# Patient Record
Sex: FEMALE | Race: WHITE | HISPANIC OR LATINO | Employment: UNEMPLOYED | URBAN - METROPOLITAN AREA
[De-identification: names, ages, dates, MRNs, and addresses within clinical notes are randomized per-mention and may not be internally consistent; named-entity substitution may affect disease eponyms.]

---

## 2017-08-15 ENCOUNTER — OFFICE VISIT (OUTPATIENT)
Dept: PAIN MEDICINE | Facility: CLINIC | Age: 42
End: 2017-08-15
Payer: COMMERCIAL

## 2017-08-15 VITALS — HEART RATE: 88 BPM | WEIGHT: 154 LBS | SYSTOLIC BLOOD PRESSURE: 128 MMHG | DIASTOLIC BLOOD PRESSURE: 82 MMHG

## 2017-08-15 DIAGNOSIS — M79.7 FIBROMYALGIA: Primary | ICD-10-CM

## 2017-08-15 DIAGNOSIS — M54.2 CERVICALGIA: ICD-10-CM

## 2017-08-15 PROCEDURE — 99999 PR PBB SHADOW E&M-NEW PATIENT-LVL III: CPT | Mod: PBBFAC,,, | Performed by: ANESTHESIOLOGY

## 2017-08-15 PROCEDURE — 99204 OFFICE O/P NEW MOD 45 MIN: CPT | Mod: S$GLB,,, | Performed by: ANESTHESIOLOGY

## 2017-08-15 RX ORDER — DULOXETIN HYDROCHLORIDE 60 MG/1
60 CAPSULE, DELAYED RELEASE ORAL DAILY
COMMUNITY

## 2017-08-15 RX ORDER — NAPROXEN AND ESOMEPRAZOLE MAGNESIUM 20; 500 MG/1; MG/1
1 TABLET, DELAYED RELEASE ORAL 2 TIMES DAILY PRN
Qty: 30 TABLET | Refills: 4 | Status: SHIPPED | OUTPATIENT
Start: 2017-08-15 | End: 2017-09-14

## 2017-08-15 RX ORDER — NAPROXEN 500 MG/1
500 TABLET ORAL 2 TIMES DAILY
COMMUNITY
End: 2017-08-15

## 2017-08-16 ENCOUNTER — TELEPHONE (OUTPATIENT)
Dept: PAIN MEDICINE | Facility: CLINIC | Age: 42
End: 2017-08-16

## 2017-08-16 NOTE — TELEPHONE ENCOUNTER
----- Message from Lisa Nayak sent at 8/16/2017 10:54 AM CDT -----  [facility] called.   [phone]. [phone]    Can pharmacy dispense 60 of Vimovo.   Please call.

## 2017-09-06 ENCOUNTER — CLINICAL SUPPORT (OUTPATIENT)
Dept: REHABILITATION | Facility: HOSPITAL | Age: 42
End: 2017-09-06
Attending: ANESTHESIOLOGY
Payer: COMMERCIAL

## 2017-09-06 DIAGNOSIS — Z74.09 IMPAIRED MOBILITY: ICD-10-CM

## 2017-09-06 DIAGNOSIS — M79.7 FIBROMYALGIA: ICD-10-CM

## 2017-09-06 DIAGNOSIS — M54.2 CHRONIC NECK PAIN: Primary | ICD-10-CM

## 2017-09-06 DIAGNOSIS — R29.3 POOR POSTURE: ICD-10-CM

## 2017-09-06 DIAGNOSIS — G89.29 CHRONIC NECK PAIN: Primary | ICD-10-CM

## 2017-09-06 PROCEDURE — 97110 THERAPEUTIC EXERCISES: CPT | Mod: PN

## 2017-09-06 PROCEDURE — 97162 PT EVAL MOD COMPLEX 30 MIN: CPT | Mod: PN

## 2017-09-06 NOTE — PLAN OF CARE
TIME RECORD    Date: 09/06/2017    Start Time:  1118  Stop Time:  1200    PROCEDURES:    TIMED  Procedure Min.   TE 10'                     UNTIMED  Procedure Min.   IE 32'         Total Timed Minutes:  10'  Total Timed Units:  1  Total Untimed Units:  1  Charges Billed/# of units:  2 (MCE-1, TE-1)    OUTPATIENT PHYSICAL THERAPY   PATIENT EVALUATION  Onset Date: 8 months ago  Primary Diagnosis:   1. Chronic neck pain     2. Impaired mobility     3. Poor posture     4. Fibromyalgia       Treatment Diagnosis: Chronic neck pain, poor posture, decreased cervical and UE strength and ROM   No past medical history on file.  Precautions: Standard, Allergies-Penecillin, Orly TAVARES instructed pt not to lift arms above shoulders.  Prior Therapy: None for current condition  Medications: Ne Lang has a current medication list which includes the following prescription(s): duloxetine and naproxen-esomeprazole.  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  Ne Lang reports neck pain beginning 8 months ago. No injury/event preceding it, pt reports cervical osteoporosis as well as fibromyalgia causing pain. Tension and stress increase pain, as well as household activities including cleaning, cooking, and lifting. Pt reports pain has improved since 8 months ago. Rest and relaxation decrease pain. Pt reports she used to have constant numbness in B UE, now has it 1x/week. Pt reports difficulty lifting >=5 lbs and that arms feel weak and heavy.     Pt was dx  with fibromyalgia 6 years ago in [place]. Pain started in wrist, treated as carpal tunnel. Every 2 years pt goes to [place] to have treatment done for Fibromyalgia, including acupuncture, natural healing methods, psychology and psychiatry sessions as well as medications prescribed by these clinicians. Reviewed with Dr. Woodson, who approved treatments and added a medication and topical cream for pain. Pt expressed she has been going to [place] for tx because she has had difficulty receiving care in  the [place] for fibromyalgia due to language barrier.     Pain:  Location: midline of cervical spine  Description: Aching, Dull, Burning and Electric  Pain Scale: 2/10 at best 5/10 now  9/10 at worst    Objective     Posture: Sitting: slumped, rounded shoulders, forward head  Palpation: TTP to B cervical paraspinals, levator scap, UT  Sensation: Intact B UE  Range of Motion/Strength:     Cervical AROM: Pain/Dysfunction with Movement:   Flexion 45 Pain in neck   Extension 25 Increased pain in neck   Right side bending 35 Pain in neck   Left side bending 30 Pain in neck   Right rotation 50 Pain in neck, decreased   Left rotation 50 Pain in neck, decreased     UE MMTs  Horizontal abduction: 4-/5 B, pain in shoulders B  Scaption: 4-/5 B, pain in shoulders B  Rhomboids: 4/5 B, pain in shoulders B    Shoulder  Right   Left  Pain/Dysfunction with Movement    AROM PROM MMT AROM PROM MMT    flexion WNL NT 4-/5 WNL NT 4-/5 Pain in shoulders B with MMT; pt requests termination of MMT before full resistance given   abduction 160  170 4-/5 160 180 4-/5 Pain in shoulders B with MMT; pt requests termination of MMT before full resistance given   Internal rotation NT NT 4-/5 NT NT 4-/5 Functional IR L3 B  Pain in shoulders B with MMT; pt requests termination of MMT before full resistance given   ER at 0° abd NT NT 4-/5 NT NT 4-/5 Functional ER C7 B  Pain in shoulders B with MMT; pt requests termination of MMT before full resistance given      Elbow  Right   Left  Pain/Dysfunction with Movement    AROM PROM MMT AROM PROM MMT    flexion NT NT 4+/5 NT NT 4+/5 Pain in elbow B with MMT   extension NT NT 4+/5 NT NT 4+/5 Pain in elbow B with MMT      (#)    Right 12 kg   Left 15 kg     Gait: no AD  Analysis: Independent  Bed Mobility:Independent  Transfers: Independent  Special Tests: Spurlings (-) B  Alar Ligament (-) B  Sharp Vika (-)  Distraction (+) for pain relief     Other: Chin tuck + cervical flexion (logissimus colli strength): Pt able to hold for 3 sec (poor - activation)    Functional Limitation Reports: G codes  Tool: FOTO Neck SURVEY  Category: Changing and maintaining body position ()  Limitation: 48%  Current: CK = at least 40% but < 60% impaired, limited or restricted  Goal: CJ = at least 20% but < 40% impaired, limited or restricted    Treatment: Therapeutic exercise including UE/cervical strengthening/stretching, postural education, and modalities prn. Instructed pt on HEP consisting of UT stretch and scapular retraction/depressions. Pt demo/verbalized by performing exercises x 10'. POC discussed with pt and pt verbalized agreement.    Hx of FIBRO, NO MT    Assessment       Initial Assessment (Pertinent finding, problem list and factors affecting outcome): Pt is a 43 yo female presenting to PT with pain, decreased cervical and UE ROM and strength, poor posture, and functional deficits with household activities. Primary impairments may be related to fibromyalgia as well as possible mechanical stresses from poor posture. Pt emotional several times during evaluation due to pain and frustration of 6 year hx of pain. Pt would benefit from skilled PT to address limitations and increase functional mobility.    History  Co-morbidities and personal factors that may impact the plan of care Examination  Body Structures and Functions, activity limitations and participation restrictions that may impact the plan of care Clinical  Presentation   Decision Making/ Complexity Score   Co-morbidities:   Fibromyalgia    Personal Factors:  Depression  Sleep dysfunction     moderate Body Regions: head, neck, UE, trunk, back, LE    Body Systems: musculoskeletal - posture, ROM, strength; neuromuscular - sensation, balance, gait    Activity limitations: household chores, activities considered stressful    Participation Restrictions: caseload of caregiving    high     FOTO Neck Survey: 48% limitation    moderate   moderate       Rehab Potiential: fair  Barriers to Rehab: dx of fibromyalgia, language barrier  Short Term Goals (4 Weeks): 10/6/17  1. Pt will be independent with HEP to supplement PT in improving functional mobility.  2. Pt to improve all impaired UE MMTs to >/=4/5  grade to improve strength for lifting tasks.  3. Pt will improve cervical ROM by 10 deg in all planes to restore functional mobility.  Long Term Goals (8 Weeks): 11/6/17  1.  Pt will improve FOTO score to </= 39% limited to decrease perceived limitation with mobility.  2. Pt to improve all impaired UE MMTs to >/= 4+/5 to improve strength for lifting tasks.  3. Pt will restore cervical ROM to WFL in all planes to restore functional mobility.  4. Pt will restore UE ROM to WFL in all planes to restore functional mobility.  5. Pt will lift >/= 5 lbs with pain </=3/10 to promote functional QOL.  6. Pt will have pain </3/10 with UE MMTs to promote functional QOL    Plan     Certification Period: 9/6/17 to 11/6/17  Recommended Treatment Plan: 2 times per week for 8 weeks: Moist Heat/ Ice, Neuromuscular Re-ed, Patient Education, Therapeutic Activites and Therapeutic Exercise              *Hx of FIBRO, NO MT*  Other Recommendations: modalities prn, ASTYM prn, kinesiotape prn, Functional Dry Needling prn     Therapist: Rosa Mata, PT    I CERTIFY THE NEED FOR THESE SERVICES FURNISHED UNDER THIS PLAN OF TREATMENT AND WHILE UNDER MY CARE    Physician's comments:  ________________________________________________________________________________________________________________________________________________      Physician's Name: ___________________________________

## 2017-09-12 ENCOUNTER — CLINICAL SUPPORT (OUTPATIENT)
Dept: REHABILITATION | Facility: HOSPITAL | Age: 42
End: 2017-09-12
Attending: ANESTHESIOLOGY
Payer: COMMERCIAL

## 2017-09-12 DIAGNOSIS — M54.2 CHRONIC NECK PAIN: ICD-10-CM

## 2017-09-12 DIAGNOSIS — R29.3 POOR POSTURE: ICD-10-CM

## 2017-09-12 DIAGNOSIS — Z74.09 IMPAIRED MOBILITY: ICD-10-CM

## 2017-09-12 DIAGNOSIS — G89.29 CHRONIC NECK PAIN: ICD-10-CM

## 2017-09-12 PROCEDURE — 97110 THERAPEUTIC EXERCISES: CPT | Mod: PN

## 2017-09-12 PROCEDURE — 97140 MANUAL THERAPY 1/> REGIONS: CPT | Mod: PN

## 2017-09-12 NOTE — PROGRESS NOTES
TIME RECORD    Date:  09/12/2017    Start Time:  9:00  Stop Time:  10:00    PROCEDURES:    TIMED  Procedure Min.   TE 30'   MT 15'                 UNTIMED  Procedure Min.   MHP 10'   Supervised 5'     Total Timed Minutes:  50  Total Timed Units:  3  Total Untimed Units:  NC  Charges Billed/# of units:  3 (2 TE + 1 MT)      Progress/Current Status    Subjective:     Patient ID: Ne Lang is a 42 y.o. female.  Diagnosis:   1. Chronic neck pain     2. Impaired mobility     3. Poor posture       Pain: 5 /10  Pt  present for start of session. Pt reports neck/shoulder/arm pain that is aggravated by working ( taker)      Objective:     Treatment: Pt started with a cervical MHP x10' with pt seated; supervised UBEx5', MT x15' consisting of STM/MFR to cervical paraspinals; B UT, B medial scapula musculature, and cervical distraction; and pt performed 1:1 TE x 30'.  DATE 9/12/17   VISIT 2   FOTO 2/5   Cap Visit  Cap Total 125.80  233.38   MT 15'   MHP 10'   UBE 6'   Cervical retractions 12x5''   Stretches: UT and Levator Scap 3x30'' each   Standing:    Rows 2x15 GTB   Lat pull downs 2x15 RTC seated   Shoulder box rolls 15x seated   INITIALS [place]         Assessment:   Pt demo fair tolerance of all TE with increased pain in other joints (elbows/hands) during back exercises; educated on resting if need and avoid significant pain during activities. Pt reported decreased pain less than or equal to 3/10 in her neck and arms. Pt requires increased TE for neck stability and long term pain reduction. Pt  present for entire session.    Patient Education/Response:   Pt educated on sleeping posture to decrease B UE swelling and body mechanics during ADLs and work.    Plans and Goals:   Cont per POC, and increase reps/intensity per pt tolerance.    Short Term Goals (4 Weeks): 10/6/17  1. Pt will be independent with HEP to supplement PT in improving functional mobility.  2. Pt to improve all impaired UE  MMTs to >/=4/5  grade to improve strength for lifting tasks.  3. Pt will improve cervical ROM by 10 deg in all planes to restore functional mobility.  Long Term Goals (8 Weeks): 11/6/17  1.  Pt will improve FOTO score to </= 39% limited to decrease perceived limitation with mobility.  2. Pt to improve all impaired UE MMTs to >/= 4+/5 to improve strength for lifting tasks.  3. Pt will restore cervical ROM to WFL in all planes to restore functional mobility.  4. Pt will restore UE ROM to WFL in all planes to restore functional mobility.  5. Pt will lift >/= 5 lbs with pain </=3/10 to promote functional QOL.  6. Pt will have pain </3/10 with UE MMTs to promote functional QOL

## 2017-09-13 ENCOUNTER — DOCUMENTATION ONLY (OUTPATIENT)
Dept: REHABILITATION | Facility: HOSPITAL | Age: 42
End: 2017-09-13

## 2017-09-15 ENCOUNTER — CLINICAL SUPPORT (OUTPATIENT)
Dept: REHABILITATION | Facility: HOSPITAL | Age: 42
End: 2017-09-15
Attending: ANESTHESIOLOGY
Payer: COMMERCIAL

## 2017-09-15 DIAGNOSIS — G89.29 CHRONIC NECK PAIN: ICD-10-CM

## 2017-09-15 DIAGNOSIS — Z74.09 IMPAIRED MOBILITY: ICD-10-CM

## 2017-09-15 DIAGNOSIS — M54.2 CHRONIC NECK PAIN: ICD-10-CM

## 2017-09-15 DIAGNOSIS — R29.3 POOR POSTURE: ICD-10-CM

## 2017-09-15 PROCEDURE — 97140 MANUAL THERAPY 1/> REGIONS: CPT | Mod: PN

## 2017-09-15 PROCEDURE — 97110 THERAPEUTIC EXERCISES: CPT | Mod: PN

## 2017-09-15 NOTE — PROGRESS NOTES
"TIME RECORD    Date:  09/15/2017    Start Time:  0755  Stop Time:  0902    PROCEDURES:    TIMED  Procedure Min.   TE 47'   MT 10'             UNTIMED  Procedure Min.   MHP 10'         Total Timed Minutes:  57'  Total Timed Units:  4  Total Untimed Units:  1  Charges Billed/# of units:  4 (TE-3, MT-1)      Progress/Current Status    Subjective:     Patient ID: Ne Lang is a 42 y.o. female.  Diagnosis:   1. Chronic neck pain     2. Impaired mobility     3. Poor posture       Pain: 7 /10 neck    Pt reports feeling better overall last session. MT helped with pain in neck but not B UE. Rows and lats made her slightly sore.     Objective:     TE 1:1 with PT x 47' per log. Added cervical rotation, horizontal abduction, pec stretch, ITYs, and protraction to TE to improve posture and cervical ROM. MT x 10' consisting of STM/MFR to cervical paraspinals; B UT, B medial scapula musculature, and cervical distraction. Session ended with MHP x 10' to neck.    DATE 9/14/17 9/12/17   VISIT 3 2   FOTO 3/5 2/5   Cap Visit  Cap Total 125.80  359.18 125.80  233.38   MT 10' 15'   MHP 10' 10'   UBE  6'   Cervical retractions 15x5'' 12x5''   Cervical rotations 5"x10 B    Horizontal abduction 2x10 YTB    Protraction 2x10 1# dowel    Stretches: UT and Levator Scap  Pec (corner) 3x30'' each 3x30'' each   Standing:     Rows 2x15 GTC seated with 1/2 roll behind back 2x15 GTB   Lat pull downs 2x10 GTC seated with 1/2 roll behind back 2x15 RTC seated   ITY With trunk supported on SB 2x10    Shoulder box rolls 15x seated 15x seated   INITIALS CC [place]     Assessment:     Increased neck pain with ITYs in prone, adjusted to standing with trunk supported on SB. Pt instructed to perform ITYs through pain free shoulder range. Slight pain in neck towards end of horizontal abduction, but performed all new TE well and had no neck/ UE pain with other exercises.  Pt re-educated on resting if need and avoid significant pain during activities. Pt " reported fatigue but decreased pain at end of session, no specific rating given.    Patient Education/Response:     Cont HEP. Pt inquired of use of TB with exercises, PT responded that we will give pt TB with increased tolerance to resisted exercises during PT sessions.    Plans and Goals:     Cont per POC, and increase reps/intensity per pt tolerance.    Short Term Goals (4 Weeks): 10/6/17  1. Pt will be independent with HEP to supplement PT in improving functional mobility.  2. Pt to improve all impaired UE MMTs to >/=4/5  grade to improve strength for lifting tasks.  3. Pt will improve cervical ROM by 10 deg in all planes to restore functional mobility.  Long Term Goals (8 Weeks): 11/6/17  1.  Pt will improve FOTO score to </= 39% limited to decrease perceived limitation with mobility.  2. Pt to improve all impaired UE MMTs to >/= 4+/5 to improve strength for lifting tasks.  3. Pt will restore cervical ROM to WFL in all planes to restore functional mobility.  4. Pt will restore UE ROM to WFL in all planes to restore functional mobility.  5. Pt will lift >/= 5 lbs with pain </=3/10 to promote functional QOL.  6. Pt will have pain </3/10 with UE MMTs to promote functional QOL

## 2017-09-19 ENCOUNTER — CLINICAL SUPPORT (OUTPATIENT)
Dept: REHABILITATION | Facility: HOSPITAL | Age: 42
End: 2017-09-19
Attending: ANESTHESIOLOGY
Payer: COMMERCIAL

## 2017-09-19 DIAGNOSIS — G89.29 CHRONIC NECK PAIN: ICD-10-CM

## 2017-09-19 DIAGNOSIS — Z74.09 IMPAIRED MOBILITY: ICD-10-CM

## 2017-09-19 DIAGNOSIS — R29.3 POOR POSTURE: ICD-10-CM

## 2017-09-19 DIAGNOSIS — M54.2 CHRONIC NECK PAIN: ICD-10-CM

## 2017-09-19 PROCEDURE — 97110 THERAPEUTIC EXERCISES: CPT | Mod: PN

## 2017-09-19 PROCEDURE — 97140 MANUAL THERAPY 1/> REGIONS: CPT | Mod: PN

## 2017-09-19 NOTE — PROGRESS NOTES
"TIME RECORD    Date:  09/19/2017    Start Time:  0805  Stop Time:  0900    PROCEDURES:    TIMED  Procedure Min.   TE 45'   MT 10'             UNTIMED  Procedure Min.   MHP 10'         Total Timed Minutes: 55'  Total Timed Units:  4  Total Untimed Units:  0  Charges Billed/# of units:  4 (TE-3, MT-1)      Progress/Current Status    Subjective:     Patient ID: Ne Lang is a 42 y.o. female.  Diagnosis:   1. Chronic neck pain     2. Impaired mobility     3. Poor posture       Pain: 7/10 neck and R arm pain  Pt reports her neck is feeling good and her main pain right now is in her L arm that is mostly due to her fibromyalgia.    Objective:     TE 1:1 with PT x 45' per log. MT x 10' consisting of STM/MFR to cervical paraspinals; B UT, B medial scapula musculature, and cervical distraction with cervical rotation. Session ended with MHP x 10' to neck.    DATE 9/18/17 9/14/17 9/12/17   VISIT 4 3 2   FOTO 4/5 3/5 2/5   Cap Visit  Cap Total 125.80  484.98 125.80  359.18 125.80  233.38   MT 10' 10' 15'   MHP 10' 10' 10'   UBE --  6'   Cervical retractions 15x5'' 15x5'' 12x5''   Cervical rotations 5''x10 5"x10 B    Horizontal abduction 2x10 YTB 2x10 YTB    Protraction 2x10 2# dowel 2x10 1# dowel    Stretches: UT and Levator Scap  Pec (corner) 3x30'' each 3x30'' each 3x30'' each   Standing:      Rows 3x10 GTC seated with 1/2 roll behind back 2x15 GTC seated with 1/2 roll behind back 2x15 GTB   Lat pull downs 3x10 GTC seated with 1/2 roll behind back 2x10 GTC seated with 1/2 roll behind back 2x15 RTC seated   ITY With trunk supported on SB 2x10 With trunk supported on SB 2x10    Shoulder box rolls 15x 15x seated 15x seated   INITIALS [place] CC [place]     Assessment:   Pt reported some forearm numbness B during pec stretch that went away upon completion, and intermittent L arm pain during rows and SB exercises. Pt performed all TE to completion with min neck/arm pain. Pt reported 2/10 neck pain and no arm pain at end of " session.      Patient Education/Response:     Cont HEP.    Plans and Goals:     Cont per POC, and increase reps/intensity per pt tolerance.    Short Term Goals (4 Weeks): 10/6/17  1. Pt will be independent with HEP to supplement PT in improving functional mobility.  2. Pt to improve all impaired UE MMTs to >/=4/5  grade to improve strength for lifting tasks.  3. Pt will improve cervical ROM by 10 deg in all planes to restore functional mobility.  Long Term Goals (8 Weeks): 11/6/17  1.  Pt will improve FOTO score to </= 39% limited to decrease perceived limitation with mobility.  2. Pt to improve all impaired UE MMTs to >/= 4+/5 to improve strength for lifting tasks.  3. Pt will restore cervical ROM to WFL in all planes to restore functional mobility.  4. Pt will restore UE ROM to WFL in all planes to restore functional mobility.  5. Pt will lift >/= 5 lbs with pain </=3/10 to promote functional QOL.  6. Pt will have pain </3/10 with UE MMTs to promote functional QOL

## 2017-09-22 ENCOUNTER — CLINICAL SUPPORT (OUTPATIENT)
Dept: REHABILITATION | Facility: HOSPITAL | Age: 42
End: 2017-09-22
Attending: ANESTHESIOLOGY
Payer: COMMERCIAL

## 2017-09-22 DIAGNOSIS — R29.3 POOR POSTURE: ICD-10-CM

## 2017-09-22 DIAGNOSIS — G89.29 CHRONIC NECK PAIN: ICD-10-CM

## 2017-09-22 DIAGNOSIS — M54.2 CHRONIC NECK PAIN: ICD-10-CM

## 2017-09-22 DIAGNOSIS — Z74.09 IMPAIRED MOBILITY: ICD-10-CM

## 2017-09-22 PROCEDURE — 97110 THERAPEUTIC EXERCISES: CPT | Mod: PN

## 2017-09-22 PROCEDURE — 97140 MANUAL THERAPY 1/> REGIONS: CPT | Mod: PN

## 2017-09-22 NOTE — PROGRESS NOTES
"TIME RECORD    Date:  09/22/2017    Start Time:  0755  Stop Time:  0858    PROCEDURES:    TIMED  Procedure Min.   TE 43'   MT 10'             UNTIMED  Procedure Min.   MHP 10'         Total Timed Minutes: 53'  Total Timed Units:  4  Total Untimed Units:  0  Charges Billed/# of units:  4 (TE-3, MT-1)      Progress/Current Status    Subjective:     Patient ID: Ne Lang is a 42 y.o. female.  Diagnosis:   1. Chronic neck pain     2. Impaired mobility     3. Poor posture       Pain: 6/10 R neck; 9/10 in B elbows    Pt reports elbow pain is due to exercise, but it calms down with periods of rest. Pt indicates MT helps with neck and arm pain, and inquires if MT can be done on elbows.     Objective:     Session began with MHP to neck x 10'. TE x 43' per log. MT x 10' consisting of STM/MFR to cervical and upper thoracic paraspinals and B UT; suboccipital release; cervical distraction with cervical rotation. Added snow angels to improve posture. FOTO done     Next session: start with pt supine on towel roll for pec stretch    DATE 9/22/17 9/18/17 9/14/17 9/12/17   VISIT 5 4 3 2   FOTO 5/5 DONE 4/5 3/5 2/5   MT 10' 10' 10' 15'   MHP 10' 10' 10' 10'   UBE  --  6'   Cervical retractions 15x5" 15x5'' 15x5'' 12x5''   Cervical rotations 5"x0 B 5''x10 5"x10 B    Snow angels W/ scapular retraction x 20      Horizontal abduction 2x12 TYB 2x10 YTB 2x10 YTB    Protraction 2x12 2# dowel 2x10 2# dowel 2x10 1# dowel    Stretches: UT and Levator Scap  Pec (corner) 3x30'' each 3x30'' each 3x30'' each 3x30'' each   Standing:       Rows 3x10 GTC seated with 1/2 roll behind back 3x10 GTC seated with 1/2 roll behind back 2x15 GTC seated with 1/2 roll behind back 2x15 GTB   Lat pull downs 3x10 GTC seated with 1/2 roll behind back 3x10 GTC seated with 1/2 roll behind back 2x10 GTC seated with 1/2 roll behind back 2x15 RTC seated   ITY With trunk supported on SB 2x10 With trunk supported on SB 2x10 With trunk supported on SB 2x10    Shoulder " box rolls x20 15x 15x seated 15x seated   INITIALS CC [place] CC [place]     Assessment:     Increased pain in B UE towards end of TE, however improved with breaks and MT. Verbal cues to squeeze shoulder blades during retraction and depression of shoulder box rolls to improve performance. Pt performed addition of snow angels well without pain.    Patient Education/Response:     Cont HEP. Pt informed that MT will focus on the neck in order to positively benefit B UE. Will hold off on giving pt TB for exercises until pt can tolerated full sets of TE without pain in B UE.    Plans and Goals:     Cont per POC, and increase reps/intensity per pt tolerance.    Short Term Goals (4 Weeks): 10/6/17  1. Pt will be independent with HEP to supplement PT in improving functional mobility.  2. Pt to improve all impaired UE MMTs to >/=4/5  grade to improve strength for lifting tasks.  3. Pt will improve cervical ROM by 10 deg in all planes to restore functional mobility.  Long Term Goals (8 Weeks): 11/6/17  1.  Pt will improve FOTO score to </= 39% limited to decrease perceived limitation with mobility.  2. Pt to improve all impaired UE MMTs to >/= 4+/5 to improve strength for lifting tasks.  3. Pt will restore cervical ROM to WFL in all planes to restore functional mobility.  4. Pt will restore UE ROM to WFL in all planes to restore functional mobility.  5. Pt will lift >/= 5 lbs with pain </=3/10 to promote functional QOL.  6. Pt will have pain </3/10 with UE MMTs to promote functional QOL

## 2017-09-26 ENCOUNTER — CLINICAL SUPPORT (OUTPATIENT)
Dept: REHABILITATION | Facility: HOSPITAL | Age: 42
End: 2017-09-26
Attending: ANESTHESIOLOGY
Payer: COMMERCIAL

## 2017-09-26 DIAGNOSIS — Z74.09 IMPAIRED MOBILITY: ICD-10-CM

## 2017-09-26 DIAGNOSIS — M54.2 CHRONIC NECK PAIN: ICD-10-CM

## 2017-09-26 DIAGNOSIS — R29.3 POOR POSTURE: ICD-10-CM

## 2017-09-26 DIAGNOSIS — G89.29 CHRONIC NECK PAIN: ICD-10-CM

## 2017-09-26 PROCEDURE — 97140 MANUAL THERAPY 1/> REGIONS: CPT | Mod: PN

## 2017-09-26 PROCEDURE — 97110 THERAPEUTIC EXERCISES: CPT | Mod: PN

## 2017-09-26 NOTE — PROGRESS NOTES
"TIME RECORD    Date:  09/26/2017    Start Time:  0812  Stop Time:  0910    PROCEDURES:    TIMED  Procedure Min.   MT 10   TE 38             UNTIMED  Procedure Min.   MHP 10'         Total Timed Minutes: 48  Total Timed Units:  3  Total Untimed Units:  0  Charges Billed/# of units:  1 MT, 2 TE      Progress/Current Status    Subjective:     Patient ID: Ne Lang is a 42 y.o. female.  Diagnosis:   1. Chronic neck pain     2. Impaired mobility     3. Poor posture       Pain: Patient reports 5/10 pain    Objective:     Patient warmed up with addition of UBE at 120 rpm 3'/3'.  MT x 10' consisting of STM/MFR to cervical and upper thoracic paraspinals and B UT; suboccipital release.  Therex as outlined per log 1:1 with PTA x 38 mintues.  MH to c-spine in sitting x 10 minutes.      DATE 9/26/17 9/22/17 9/18/17 9/14/17 9/12/17   VISIT 6 5 4 3 2   FOTO 6/10 5/5 DONE 4/5 3/5 2/5   MT 10' 10' 10' 10' 15'   MHP -- 10' 10' 10' 10'   UBE   --  6'   Cervical retractions 5"x15 15x5" 15x5'' 15x5'' 12x5''   Cervical rotations 5"x10 B 5"x0 B 5''x10 5"x10 B    Snow angels W/scapular  Retraction x20 W/ scapular retraction x 20      Horizontal abduction YTB 2x12 2x12 TYB 2x10 YTB 2x10 YTB    Protraction 2# dowel  2x12 2x12 2# dowel 2x10 2# dowel 2x10 1# dowel    Pec Stretch Supine over towel roll 3' -      Stretches: UT and Levator Scap  Pec (corner) 30"x3  3x30'' each 3x30'' each 3x30'' each 3x30'' each   Standing:        Rows 3x10 GTC seated  With 1/2 roll behind back 3x10 GTC seated with 1/2 roll behind back 3x10 GTC seated with 1/2 roll behind back 2x15 GTC seated with 1/2 roll behind back 2x15 GTB   Lat pull downs 3x10 GTC seated with 1/2 roll behind back 3x10 GTC seated with 1/2 roll behind back 3x10 GTC seated with 1/2 roll behind back 2x10 GTC seated with 1/2 roll behind back 2x15 RTC seated   I/T/Y With trunk supported on SB  2x10 With trunk supported on SB 2x10 With trunk supported on SB 2x10 With trunk supported on SB " 2x10    Shoulder box rolls x20 x20 15x 15x seated 15x seated   INITIALS CS 1/6 CC [place] CC [place]     Assessment:     Patient experienced some B UE pain with therex but able to complete.  Patient Education/Response:     Cont HEP.     Plans and Goals:     Cont per POC, and increase reps/intensity per pt tolerance.    Short Term Goals (4 Weeks): 10/6/17  1. Pt will be independent with HEP to supplement PT in improving functional mobility.  2. Pt to improve all impaired UE MMTs to >/=4/5  grade to improve strength for lifting tasks.  3. Pt will improve cervical ROM by 10 deg in all planes to restore functional mobility.  Long Term Goals (8 Weeks): 11/6/17  1.  Pt will improve FOTO score to </= 39% limited to decrease perceived limitation with mobility.  2. Pt to improve all impaired UE MMTs to >/= 4+/5 to improve strength for lifting tasks.  3. Pt will restore cervical ROM to WFL in all planes to restore functional mobility.  4. Pt will restore UE ROM to WFL in all planes to restore functional mobility.  5. Pt will lift >/= 5 lbs with pain </=3/10 to promote functional QOL.  6. Pt will have pain </3/10 with UE MMTs to promote functional QOL

## 2017-09-29 ENCOUNTER — TELEPHONE (OUTPATIENT)
Dept: REHABILITATION | Facility: HOSPITAL | Age: 42
End: 2017-09-29

## 2017-10-03 NOTE — PROGRESS NOTES
Face to Face PTA Conference performed with Rosa Thomson PTA, Elena Robert PTA, Ryan Adair PTA Taiwo Howe PTA regarding patient's current status, overall progress, and plan of care    Face to face PT Conference performed with Margarita Sethi PT , Priya Ramos PT , Valentina Cervantes PT , Marcus Lam PT, Rosa Mata PT regarding patient progress, current status, and plan of care.     Taiwo Howe, PTA    Face to face meeting completed with Marcus Lam PT regarding current status and progress of   Ne Lang Ryan Adair PTA     Face to face meeting completed with Marcus Lam PT regarding current status and progress of   Ne Lang.  Elena Robert, PTA

## 2017-10-04 ENCOUNTER — CLINICAL SUPPORT (OUTPATIENT)
Dept: REHABILITATION | Facility: HOSPITAL | Age: 42
End: 2017-10-04
Attending: ANESTHESIOLOGY
Payer: COMMERCIAL

## 2017-10-04 DIAGNOSIS — G89.29 CHRONIC NECK PAIN: ICD-10-CM

## 2017-10-04 DIAGNOSIS — R29.3 POOR POSTURE: ICD-10-CM

## 2017-10-04 DIAGNOSIS — Z74.09 IMPAIRED MOBILITY: ICD-10-CM

## 2017-10-04 DIAGNOSIS — M54.2 CHRONIC NECK PAIN: ICD-10-CM

## 2017-10-04 PROCEDURE — 97140 MANUAL THERAPY 1/> REGIONS: CPT | Mod: PN

## 2017-10-04 PROCEDURE — 97110 THERAPEUTIC EXERCISES: CPT | Mod: PN

## 2017-10-04 NOTE — PROGRESS NOTES
"TIME RECORD    Date:  10/04/2017    Start Time:  0810  Stop Time:  0900    PROCEDURES:    TIMED  Procedure Min.   MT 10'   TE 40'             UNTIMED  Procedure Min.         Total Timed Minutes: 50'  Total Timed Units:  4  Total Untimed Units:  0  Charges Billed/# of units:  4 (MT-1, TE-3)      Progress/Current Status    Subjective:     Patient ID: Ne Lang is a 42 y.o. female.  Diagnosis:   1. Chronic neck pain     2. Impaired mobility     3. Poor posture       Pain: Patient reports 5/10 pain    Pt reports the crown of her neck feels inflamed. Pt feels like she's always going to have similar levels of pain due to fibromyalgia but reports pain in her neck and back has improved 50% since starting therapy.     Objective:     MT x 10' consisting of STM/MFR to B cervical and upper thoracic paraspinals, UT, LV, scalenes and insertion of SCM; suboccipital release and gentle cervical retraction. TE x 40' per log. Added serratus wall slides to improve shoulder stability. Added abd wall slides to improve active ROM as abduction was impaired in initial evaluation.    DATE 10/4/17 9/26/17 9/22/17 9/18/17 9/14/17 9/12/17   VISIT 7 6 5 4 3 2   FOTO 7/10 6/10 5/5 DONE 4/5 3/5 2/5   MT 10' 10' 10' 10' 10' 15'   MHP  -- 10' 10' 10' 10'   UBE    --  6'   Cervical retractions Sitting  5"x15 5"x15 15x5" 15x5'' 15x5'' 12x5''   Cervical rotations D/c 5"x10 B 5"x0 B 5''x10 5"x10 B    Snow angels Wall angels w/ scapular retraction x20 W/scapular  Retraction x20 W/ scapular retraction x 20      Horizontal abduction  YTB 2x12 2x12 TYB 2x10 YTB 2x10 YTB    Protraction oot 2# dowel  2x12 2x12 2# dowel 2x10 2# dowel 2x10 1# dowel    Pec Stretch Supine over towel roll 3' foam roll 3' -      Stretches: UT and Levator Scap  Pec (corner) oot 30"x3  3x30'' each 3x30'' each 3x30'' each 3x30'' each   Standing:         Rows 2x15 GTC seated with 1/2 roll behind back 3x10 GTC seated  With 1/2 roll behind back 3x10 GTC seated with 1/2 roll behind " back 3x10 GTC seated with 1/2 roll behind back 2x15 GTC seated with 1/2 roll behind back 2x15 GTB   Lat pull downs 2x15 GTC seated with 1/2 roll behind back 3x10 GTC seated with 1/2 roll behind back 3x10 GTC seated with 1/2 roll behind back 3x10 GTC seated with 1/2 roll behind back 2x10 GTC seated with 1/2 roll behind back 2x15 RTC seated   I/T/Y With trunk supported on SB  2x10 With trunk supported on SB  2x10 With trunk supported on SB 2x10 With trunk supported on SB 2x10 With trunk supported on SB 2x10    Shoulder box rolls x20 x20 x20 15x 15x seated 15x seated   Horizontal abduction pulses x20 at 45 deg flex, 90 deg flex, 135 deg flex        Wall slides x10 abd  2x10 serratus        INITIALS CC CS 1/6 CC [facility] CC [facility]     Assessment:     Improvement in pain after MT, not specific to scale. Fatigue towards end of horizontal abduction pulse set in each degree of flexion, however no increased pain reported. Continue to keep MT minimal, focusing on improving periscapular strength for posture as tolerated.    Patient Education/Response:     Cont HEP.     Plans and Goals:     Cont per POC, and increase reps/intensity per pt tolerance.    Short Term Goals (4 Weeks): 10/6/17  1. Pt will be independent with HEP to supplement PT in improving functional mobility.  2. Pt to improve all impaired UE MMTs to >/=4/5  grade to improve strength for lifting tasks.  3. Pt will improve cervical ROM by 10 deg in all planes to restore functional mobility.  Long Term Goals (8 Weeks): 11/6/17  1.  Pt will improve FOTO score to </= 39% limited to decrease perceived limitation with mobility.  2. Pt to improve all impaired UE MMTs to >/= 4+/5 to improve strength for lifting tasks.  3. Pt will restore cervical ROM to WFL in all planes to restore functional mobility.  4. Pt will restore UE ROM to WFL in all planes to restore functional mobility.  5. Pt will lift >/= 5 lbs with pain </=3/10 to promote functional QOL.  6. Pt will have pain </3/10  with UE MMTs to promote functional QOL

## 2017-10-06 ENCOUNTER — CLINICAL SUPPORT (OUTPATIENT)
Dept: REHABILITATION | Facility: HOSPITAL | Age: 42
End: 2017-10-06
Attending: ANESTHESIOLOGY
Payer: COMMERCIAL

## 2017-10-06 DIAGNOSIS — G89.29 CHRONIC NECK PAIN: ICD-10-CM

## 2017-10-06 DIAGNOSIS — Z74.09 IMPAIRED MOBILITY: ICD-10-CM

## 2017-10-06 DIAGNOSIS — M54.2 CHRONIC NECK PAIN: ICD-10-CM

## 2017-10-06 DIAGNOSIS — R29.3 POOR POSTURE: ICD-10-CM

## 2017-10-06 PROCEDURE — 97110 THERAPEUTIC EXERCISES: CPT | Mod: PN

## 2017-10-06 NOTE — PROGRESS NOTES
"TIME RECORD    Date:  10/06/2017    Start Time:  0805  Stop Time:  0903    PROCEDURES:    TIMED  Procedure Min.   TE 57'                 UNTIMED  Procedure Min.   MHP 5'     Total Timed Minutes: 57'  Total Timed Units:  4  Total Untimed Units:  0  Charges Billed/# of units:  4 (TE-4)      Progress/Current Status    Subjective:     Patient ID: Ne Lang is a 42 y.o. female.  Diagnosis:   1. Chronic neck pain     2. Impaired mobility     3. Poor posture       Pain: Patient reports 0/10 pain    Pt reports she took a hot bath which helped relieve pain. Pt reports she would experience pain if she pressed hard on tender points of her neck.     Objective:     MT deferred due to reports of no pain. TE x 55' per log. Added wall walks and push up plus (plus only ) to TE to increase periscapular strength. Session ended with cervical MHP x 5'    DATE 10/6/17 10/4/17 9/26/17 9/22/17 9/18/17 9/14/17 9/12/17   VISIT 8 7 6 5 4 3 2   FOTO 8/10 7/10 6/10 5/5 DONE 4/5 3/5 2/5   MT  10' 10' 10' 10' 10' 15'   MHP 5'  -- 10' 10' 10' 10'   UBE     --  6'   Cervical retractions Sitting  5"x15 Sitting  5"x15 5"x15 15x5" 15x5'' 15x5'' 12x5''   Cervical rotations  D/c 5"x10 B 5"x0 B 5''x10 5"x10 B    Snow angels Wall angels w/ scapular retraction x20 Wall angels w/ scapular retraction x20 W/scapular  Retraction x20 W/ scapular retraction x 20      Horizontal abduction   YTB 2x12 2x12 TYB 2x10 YTB 2x10 YTB    Protraction D/c  oot 2# dowel  2x12 2x12 2# dowel 2x10 2# dowel 2x10 1# dowel    Pec Stretch Supine over towel roll 5' foam roll 3' foam roll 3' -      Stretches: UT and Levator Scap  Pec (corner) 30"x3   Perform pec stretch over foam roll listed above oot 30"x3  3x30'' each 3x30'' each 3x30'' each 3x30'' each   Standing:          Rows 2x15 GTC standing 2x15 GTC seated with 1/2 roll behind back 3x10 GTC seated  With 1/2 roll behind back 3x10 GTC seated with 1/2 roll behind back 3x10 GTC seated with 1/2 roll behind back 2x15 GTC " seated with 1/2 roll behind back 2x15 GTB   Lat pull downs 2x15 GTC standing 2x15 GTC seated with 1/2 roll behind back 3x10 GTC seated with 1/2 roll behind back 3x10 GTC seated with 1/2 roll behind back 3x10 GTC seated with 1/2 roll behind back 2x10 GTC seated with 1/2 roll behind back 2x15 RTC seated   I/T/Y With trunk supported on SB  2x10 With trunk supported on SB  2x10 With trunk supported on SB  2x10 With trunk supported on SB 2x10 With trunk supported on SB 2x10 With trunk supported on SB 2x10    Shoulder box rolls x20 x20 x20 x20 15x 15x seated 15x seated   Horizontal abduction pulses x20 at 45 deg flex, 90 deg flex, 135 deg flex  ^band next if able? x20 at 45 deg flex, 90 deg flex, 135 deg flex        Push up plus Plus only  x10         Wall walks 2 laps on corner wall TYB         Wall slides x10 abd  2x12 serratus x10 abd  2x10 serratus        INITIALS CC CC CS 1/6 CC [place] CC [facility]     Assessment:     Fatigue towards end of serratus wall slide sets. Pain in B elbow with wall walks, dissipated soon after exercise. Defer MT if no pain, keep minimal if pain present. Continue to focus on improving periscapular strength for posture as tolerated.    Patient Education/Response:     Updated HEP to include seated chin tucks, rows and lats (given YTB to perform), and UT/LV/pec stretches    Plans and Goals:     Cont per POC, and increase reps/intensity per pt tolerance.    Short Term Goals (4 Weeks): 10/6/17  1. Pt will be independent with HEP to supplement PT in improving functional mobility.  2. Pt to improve all impaired UE MMTs to >/=4/5  grade to improve strength for lifting tasks.  3. Pt will improve cervical ROM by 10 deg in all planes to restore functional mobility.  Long Term Goals (8 Weeks): 11/6/17  1.  Pt will improve FOTO score to </= 39% limited to decrease perceived limitation with mobility.  2. Pt to improve all impaired UE MMTs to >/= 4+/5 to improve strength for lifting tasks.  3. Pt will restore  cervical ROM to WFL in all planes to restore functional mobility.  4. Pt will restore UE ROM to WFL in all planes to restore functional mobility.  5. Pt will lift >/= 5 lbs with pain </=3/10 to promote functional QOL.  6. Pt will have pain </3/10 with UE MMTs to promote functional QOL

## 2017-10-11 ENCOUNTER — CLINICAL SUPPORT (OUTPATIENT)
Dept: REHABILITATION | Facility: HOSPITAL | Age: 42
End: 2017-10-11
Attending: ANESTHESIOLOGY
Payer: COMMERCIAL

## 2017-10-11 DIAGNOSIS — M54.2 CHRONIC NECK PAIN: ICD-10-CM

## 2017-10-11 DIAGNOSIS — G89.29 CHRONIC NECK PAIN: ICD-10-CM

## 2017-10-11 DIAGNOSIS — Z74.09 IMPAIRED MOBILITY: ICD-10-CM

## 2017-10-11 DIAGNOSIS — R29.3 POOR POSTURE: ICD-10-CM

## 2017-10-11 PROCEDURE — 97110 THERAPEUTIC EXERCISES: CPT | Mod: PN

## 2017-10-11 NOTE — PROGRESS NOTES
"TIME RECORD    Date:  10/11/2017    Start Time: 0804  Stop Time: 0858    PROCEDURES:    TIMED  Procedure Min.   TE 49'                 UNTIMED  Procedure Min.   MHP 10'     Total Timed Minutes: 49'  Total Timed Units:  3  Total Untimed Units:  0  Charges Billed/# of units:  3 (TE-3)      Progress/Current Status    Subjective:     Patient ID: Ne Lang is a 42 y.o. female.  Diagnosis:   1. Chronic neck pain     2. Impaired mobility     3. Poor posture       Pain: Patient reports 0/10 pain    Pt reports she's doing fine and sleeping well related to neck pain.     Objective:     MT deferred due to reports of no pain. TE x 49' per log. Added wall clocks to TE to improve periscapular strength. Session ended with cervical MHP x 10' during portion of pec stretch on foam roller    DATE 10/11/17 10/6/17 10/4/17 9/26/17 9/22/17 9/18/17 9/14/17 9/12/17   VISIT 9 8 7 6 5 4 3 2   FOTO 9/10 Next 8/10 7/10 6/10 5/5 DONE 4/5 3/5 2/5   MT   10' 10' 10' 10' 10' 15'   MHP 10' 5'  -- 10' 10' 10' 10'   UBE      --  6'   Cervical retractions Sitting  5"x15 Sitting  5"x15 Sitting  5"x15 5"x15 15x5" 15x5'' 15x5'' 12x5''   Cervical rotations   D/c 5"x10 B 5"x0 B 5''x10 5"x10 B    Snow angels Wall angels w/ scapular retraction x20 Wall angels w/ scapular retraction x20 Wall angels w/ scapular retraction x20 W/scapular  Retraction x20 W/ scapular retraction x 20      Horizontal abduction    YTB 2x12 2x12 TYB 2x10 YTB 2x10 YTB    Protraction  D/c  oot 2# dowel  2x12 2x12 2# dowel 2x10 2# dowel 2x10 1# dowel    Pec Stretch Supine over towel roll 5' foam roll 5' foam roll 3' foam roll 3' -      Stretches: UT and Levator Scap  Pec (corner) 30"x3  30"x3   Perform pec stretch over foam roll listed above oot 30"x3  3x30'' each 3x30'' each 3x30'' each 3x30'' each   Standing:           Rows 2x10 BTC standing 2x15 GTC standing 2x15 GTC seated with 1/2 roll behind back 3x10 GTC seated  With 1/2 roll behind back 3x10 GTC seated with 1/2 roll behind " back 3x10 GTC seated with 1/2 roll behind back 2x15 GTC seated with 1/2 roll behind back 2x15 GTB   Lat pull downs 2x10 BTC standing 2x15 GTC standing 2x15 GTC seated with 1/2 roll behind back 3x10 GTC seated with 1/2 roll behind back 3x10 GTC seated with 1/2 roll behind back 3x10 GTC seated with 1/2 roll behind back 2x10 GTC seated with 1/2 roll behind back 2x15 RTC seated   I/T/Y With trunk supported on SB  2x12 With trunk supported on SB  2x10 With trunk supported on SB  2x10 With trunk supported on SB  2x10 With trunk supported on SB 2x10 With trunk supported on SB 2x10 With trunk supported on SB 2x10    Shoulder box rolls x20 x20 x20 x20 x20 15x 15x seated 15x seated   Horizontal abduction pulses YTB x20 at 45 deg flex, 90 deg flex, 135 deg flex x20 at 45 deg flex, 90 deg flex, 135 deg flex  ^band next if able? x20 at 45 deg flex, 90 deg flex, 135 deg flex        Push up plus Plus only  x10 Plus only  x10         Wall clocks x10 B no resistance          Wall walks 3 laps on corner wall TYB 2 laps on corner wall TYB         Wall slides x15 abd B  2x15 serratus x10 abd  2x12 serratus x10 abd  2x10 serratus        INITIALS CC CC CC CS 1/6 CC [place] CC [facility]     Assessment:     Fatigue towards end of serratus wall slide sets. Pain in B elbow with wall walks, dissipated soon after exercise. Defer MT if no pain, keep minimal if pain present. Continue to focus on improving periscapular strength for posture as tolerated.    Patient Education/Response:     Updated HEP to include seated chin tucks, rows and lats (given YTB to perform), and UT/LV/pec stretches.    Plans and Goals:     Cont per POC, and increase reps/intensity per pt tolerance.    Short Term Goals (4 Weeks): 10/6/17  1. Pt will be independent with HEP to supplement PT in improving functional mobility.  2. Pt to improve all impaired UE MMTs to >/=4/5  grade to improve strength for lifting tasks.  3. Pt will improve cervical ROM by 10 deg in all planes to restore  functional mobility.  Long Term Goals (8 Weeks): 11/6/17  1.  Pt will improve FOTO score to </= 39% limited to decrease perceived limitation with mobility.  2. Pt to improve all impaired UE MMTs to >/= 4+/5 to improve strength for lifting tasks.  3. Pt will restore cervical ROM to WFL in all planes to restore functional mobility.  4. Pt will restore UE ROM to WFL in all planes to restore functional mobility.  5. Pt will lift >/= 5 lbs with pain </=3/10 to promote functional QOL.  6. Pt will have pain </3/10 with UE MMTs to promote functional QOL

## 2017-10-13 ENCOUNTER — CLINICAL SUPPORT (OUTPATIENT)
Dept: REHABILITATION | Facility: HOSPITAL | Age: 42
End: 2017-10-13
Attending: ANESTHESIOLOGY
Payer: COMMERCIAL

## 2017-10-13 DIAGNOSIS — Z74.09 IMPAIRED MOBILITY: ICD-10-CM

## 2017-10-13 DIAGNOSIS — M54.2 CHRONIC NECK PAIN: ICD-10-CM

## 2017-10-13 DIAGNOSIS — G89.29 CHRONIC NECK PAIN: ICD-10-CM

## 2017-10-13 DIAGNOSIS — R29.3 POOR POSTURE: ICD-10-CM

## 2017-10-13 PROCEDURE — 97110 THERAPEUTIC EXERCISES: CPT | Mod: PN

## 2017-10-13 NOTE — PROGRESS NOTES
"TIME RECORD    Date:  10/13/2017    Start Time: 0803  Stop Time: 0900    PROCEDURES:    TIMED  Procedure Min.   TE supervised 40'   TE 17'             UNTIMED  Procedure Min.   MHP 10'     Total Timed Minutes: 17'  Total Timed Units:  1  Total Untimed Units:  3  Charges Billed/# of units:  TE-1      Progress/Current Status    Subjective:     Patient ID: Ne Lang is a 42 y.o. female.  Diagnosis:   1. Chronic neck pain     2. Impaired mobility     3. Poor posture       Pain: 0/10 neck pain    Objective:     MT deferred due to reports of no pain. Session began with cervical MHP x 10' during pec stretch on foam roller. TE supervised x 30' f/b 1:1 with PT x 17' per log. FOTO done!    DATE 10/13/17 10/11/17 10/6/17 10/4/17 9/26/17 9/22/17 9/18/17 9/14/17 9/12/17   VISIT 10 9 8 7 6 5 4 3 2   FOTO 10/10 done 9/10 Next 8/10 7/10 6/10 5/5 DONE 4/5 3/5 2/5   MT    10' 10' 10' 10' 10' 15'   MHP 10' 10' 5'  -- 10' 10' 10' 10'   UBE       --  6'   Cervical retractions Sitting  5"x15  ^HEP next Sitting  5"x15 Sitting  5"x15 Sitting  5"x15 5"x15 15x5" 15x5'' 15x5'' 12x5''   Cervical rotations    D/c 5"x10 B 5"x0 B 5''x10 5"x10 B    Snow angels Wall angels w/ scapular retraction x20 Wall angels w/ scapular retraction x20 Wall angels w/ scapular retraction x20 Wall angels w/ scapular retraction x20 W/scapular  Retraction x20 W/ scapular retraction x 20      Horizontal abduction     YTB 2x12 2x12 TYB 2x10 YTB 2x10 YTB    Protraction   D/c  oot 2# dowel  2x12 2x12 2# dowel 2x10 2# dowel 2x10 1# dowel    Pec Stretch Supine over towel roll 10' foam roll during MHP 5' foam roll 5' foam roll 3' foam roll 3' -      Stretches: UT and Levator Scap  Pec (corner) 30"x3  30"x3  30"x3   Perform pec stretch over foam roll listed above oot 30"x3  3x30'' each 3x30'' each 3x30'' each 3x30'' each   Standing:            Rows 2x10 BTC standing 2x10 BTC standing 2x15 GTC standing 2x15 GTC seated with 1/2 roll behind back 3x10 GTC seated  With 1/2 " roll behind back 3x10 GTC seated with 1/2 roll behind back 3x10 GTC seated with 1/2 roll behind back 2x15 GTC seated with 1/2 roll behind back 2x15 GTB   Lat pull downs 2x10 BTC standing 2x10 BTC standing 2x15 GTC standing 2x15 GTC seated with 1/2 roll behind back 3x10 GTC seated with 1/2 roll behind back 3x10 GTC seated with 1/2 roll behind back 3x10 GTC seated with 1/2 roll behind back 2x10 GTC seated with 1/2 roll behind back 2x15 RTC seated   I/T/Y With trunk supported on SB  2x12 With trunk supported on SB  2x12 With trunk supported on SB  2x10 With trunk supported on SB  2x10 With trunk supported on SB  2x10 With trunk supported on SB 2x10 With trunk supported on SB 2x10 With trunk supported on SB 2x10    Scaption x10 RTB           Shoulder box rolls x20 x20 x20 x20 x20 x20 15x 15x seated 15x seated   Horizontal abduction pulses YTB x20 at 45 deg flex, 90 deg flex, 135 deg flex YTB x20 at 45 deg flex, 90 deg flex, 135 deg flex x20 at 45 deg flex, 90 deg flex, 135 deg flex  ^band next if able? x20 at 45 deg flex, 90 deg flex, 135 deg flex        Push up plus Push up +   2x10 Plus only  x10 Plus only  x10         Wall clocks x10 B no resistance x10 B no resistance          Wall walks 1 laps on corner wall TYB 3 laps on corner wall TYB 2 laps on corner wall TYB         Wall slides x15 abd B  2x15 serratus x15 abd B  2x15 serratus x10 abd  2x12 serratus x10 abd  2x10 serratus        INITIALS CC CC CC CC CS 1/6 CC [place] CC [facility]     Assessment:     Slight pain in neck with ITYs, dissipated after expercise. Pain in B elbow with wall walks, terminated after 1 lap but pain dissipated soon after exercise. Defer MT if no neck pain, keep minimal if pain present. Continue to focus on improving periscapular strength for posture as tolerated. If pain continues to not be present initially during sessions, pt may be ready for possible early d/c    Patient Education/Response:     Updated HEP to include seated chin tucks, rows and  lats (given YTB to perform), and UT/LV/pec stretches.    Plans and Goals:     Cont per POC, and increase reps/intensity per pt tolerance.    Short Term Goals (4 Weeks): 10/6/17  1. Pt will be independent with HEP to supplement PT in improving functional mobility.  2. Pt to improve all impaired UE MMTs to >/=4/5  grade to improve strength for lifting tasks.  3. Pt will improve cervical ROM by 10 deg in all planes to restore functional mobility.  Long Term Goals (8 Weeks): 11/6/17  1.  Pt will improve FOTO score to </= 39% limited to decrease perceived limitation with mobility.  2. Pt to improve all impaired UE MMTs to >/= 4+/5 to improve strength for lifting tasks.  3. Pt will restore cervical ROM to WFL in all planes to restore functional mobility.  4. Pt will restore UE ROM to WFL in all planes to restore functional mobility.  5. Pt will lift >/= 5 lbs with pain </=3/10 to promote functional QOL.  6. Pt will have pain </3/10 with UE MMTs to promote functional QOL

## 2017-10-18 ENCOUNTER — CLINICAL SUPPORT (OUTPATIENT)
Dept: REHABILITATION | Facility: HOSPITAL | Age: 42
End: 2017-10-18
Attending: ANESTHESIOLOGY
Payer: COMMERCIAL

## 2017-10-18 DIAGNOSIS — M54.2 CHRONIC NECK PAIN: ICD-10-CM

## 2017-10-18 DIAGNOSIS — R29.3 POOR POSTURE: ICD-10-CM

## 2017-10-18 DIAGNOSIS — G89.29 CHRONIC NECK PAIN: ICD-10-CM

## 2017-10-18 DIAGNOSIS — Z74.09 IMPAIRED MOBILITY: ICD-10-CM

## 2017-10-18 PROCEDURE — 97110 THERAPEUTIC EXERCISES: CPT | Mod: PN

## 2017-10-18 NOTE — PROGRESS NOTES
"TIME RECORD    Date:  10/18/2017    Start Time:  805  Stop Time:  855    PROCEDURES:    TIMED  Procedure Min.   Therex 45   Therex supervised 10                 UNTIMED  Procedure Min.   MHP 10         Total Timed Minutes:  45  Total Timed Units:  3  Total Untimed Units:  0  Charges Billed/# of units:  3 TE      Progress/Current Status    Subjective:     Patient ID: Ne Lang is a 42 y.o. female.  Diagnosis:   1. Chronic neck pain     2. Impaired mobility     3. Poor posture       Patient reports no pain this morning, but with assist of , states she was in severe pain after last visit and stayed in bed all next day after.      Objective:     MT deferred due to reports of no pain.  Therex performed x 45 minutes with 1:1 by PTA per log. Session ended with cervical MHP x10' during pec stretch on foam roller with supervision.    DATE 10/18/17 10/13/17 10/11/17 10/6/17 10/4/17 9/26/17 9/22/17 9/18/17 9/14/17 9/12/17   VISIT 11 10 9 8 7 6 5 4 3 2   FOTO dc 10/10 done 9/10 Next 8/10 7/10 6/10 5/5 DONE 4/5 3/5 2/5   MT     10' 10' 10' 10' 10' 15'   MHP 10' 10' 10' 5'  -- 10' 10' 10' 10'   UBE        --  6'   Cervical retractions 5"x15 HEP next Sitting  5"x15  ^HEP next Sitting  5"x15 Sitting  5"x15 Sitting  5"x15 5"x15 15x5" 15x5'' 15x5'' 12x5''   Cervical rotations     D/c 5"x10 B 5"x0 B 5''x10 5"x10 B    Snow angels Wall angels w/ scapular retraction x20 Wall angels w/ scapular retraction x20 Wall angels w/ scapular retraction x20 Wall angels w/ scapular retraction x20 Wall angels w/ scapular retraction x20 W/scapular  Retraction x20 W/ scapular retraction x 20      Horizontal abduction      YTB 2x12 2x12 TYB 2x10 YTB 2x10 YTB    Protraction    D/c  oot 2# dowel  2x12 2x12 2# dowel 2x10 2# dowel 2x10 1# dowel    Pec Stretch Supine over towel roll 10' w/foam roll and MHP 10' foam roll during MHP 5' foam roll 5' foam roll 3' foam roll 3' -      Stretches: UT and Levator Scap  Pec (corner) 30"x3 ea B 30"x3  " "30"x3  30"x3   Perform pec stretch over foam roll listed above oot 30"x3  3x30'' each 3x30'' each 3x30'' each 3x30'' each   Standing:             Rows 2x10 BTC standing 2x10 BTC standing 2x10 BTC standing 2x15 GTC standing 2x15 GTC seated with 1/2 roll behind back 3x10 GTC seated  With 1/2 roll behind back 3x10 GTC seated with 1/2 roll behind back 3x10 GTC seated with 1/2 roll behind back 2x15 GTC seated with 1/2 roll behind back 2x15 GTB   Lat pull downs 2x10 BTC standing 2x10 BTC standing 2x10 BTC standing 2x15 GTC standing 2x15 GTC seated with 1/2 roll behind back 3x10 GTC seated with 1/2 roll behind back 3x10 GTC seated with 1/2 roll behind back 3x10 GTC seated with 1/2 roll behind back 2x10 GTC seated with 1/2 roll behind back 2x15 RTC seated   I/T/Y With trunk supported on SB  2x12 With trunk supported on SB  2x12 With trunk supported on SB  2x12 With trunk supported on SB  2x10 With trunk supported on SB  2x10 With trunk supported on SB  2x10 With trunk supported on SB 2x10 With trunk supported on SB 2x10 With trunk supported on SB 2x10    Scaption YTB x 10 x10 RTB           Shoulder box rolls 20 x20 x20 x20 x20 x20 x20 15x 15x seated 15x seated   Horizontal abduction pulses YTB x20 at 45 deg flex, 90 deg flex, 135 deg flex YTB x20 at 45 deg flex, 90 deg flex, 135 deg flex YTB x20 at 45 deg flex, 90 deg flex, 135 deg flex x20 at 45 deg flex, 90 deg flex, 135 deg flex  ^band next if able? x20 at 45 deg flex, 90 deg flex, 135 deg flex        Push up plus held Push up +   2x10 Plus only  x10 Plus only  x10         Wall clocks held x10 B no resistance x10 B no resistance          Wall walks 1 laps on corner wall YTB 1 laps on corner wall TYB 3 laps on corner wall TYB 2 laps on corner wall TYB         Wall slides x15 abd B  2x15 serratus x15 abd B  2x15 serratus x15 abd B  2x15 serratus x10 abd  2x12 serratus x10 abd  2x10 serratus        INITIALS DH 1/6 CC CC CC CC CS 1/6 CC [place] AVILA     Assessment:     Held " "all reps same, decreased scaption to YTB due to reports of pain after last session.  Completed all other activity with reports of "good" after treatment.    Patient Education/Response:     Patient educated to continue HEP.  Verbalized understanding.      Plans and Goals:     Cont per POC, and increase reps/intensity per pt tolerance.    Short Term Goals (4 Weeks): 10/6/17  1. Pt will be independent with HEP to supplement PT in improving functional mobility.  2. Pt to improve all impaired UE MMTs to >/=4/5  grade to improve strength for lifting tasks.  3. Pt will improve cervical ROM by 10 deg in all planes to restore functional mobility.  Long Term Goals (8 Weeks): 11/6/17  1.  Pt will improve FOTO score to </= 39% limited to decrease perceived limitation with mobility.  2. Pt to improve all impaired UE MMTs to >/= 4+/5 to improve strength for lifting tasks.  3. Pt will restore cervical ROM to WFL in all planes to restore functional mobility.  4. Pt will restore UE ROM to WFL in all planes to restore functional mobility.  5. Pt will lift >/= 5 lbs with pain </=3/10 to promote functional QOL.  6. Pt will have pain </3/10 with UE MMTs to promote functional QOL      "

## 2017-10-20 ENCOUNTER — CLINICAL SUPPORT (OUTPATIENT)
Dept: REHABILITATION | Facility: HOSPITAL | Age: 42
End: 2017-10-20
Attending: ANESTHESIOLOGY
Payer: COMMERCIAL

## 2017-10-20 DIAGNOSIS — Z74.09 IMPAIRED MOBILITY: ICD-10-CM

## 2017-10-20 DIAGNOSIS — R29.3 POOR POSTURE: ICD-10-CM

## 2017-10-20 DIAGNOSIS — G89.29 CHRONIC NECK PAIN: ICD-10-CM

## 2017-10-20 DIAGNOSIS — M54.2 CHRONIC NECK PAIN: ICD-10-CM

## 2017-10-20 PROCEDURE — 97110 THERAPEUTIC EXERCISES: CPT | Mod: PN

## 2017-10-20 NOTE — PROGRESS NOTES
"TIME RECORD    Date:  10/20/2017    Start Time:  8:00  Stop Time:  8:55    PROCEDURES:    TIMED  Procedure Min.   Therex 45                     UNTIMED  Procedure Min.   MHP 10 NC         Total Timed Minutes:  45  Total Timed Units:  3  Total Untimed Units:  NC  Charges Billed/# of units:  3 TE      Progress/Current Status    Subjective:     Patient ID: Ne Lang is a 42 y.o. female.  Diagnosis:   1. Chronic neck pain     2. Impaired mobility     3. Poor posture       Pt reports no neck pain this morning and feeling ok.    Objective:     MT deferred due to reports of no pain.  Therex performed x 45 minutes with 1:1 by PT per log. Session ended with cervical MHP x10' during pec stretch on foam roller with supervision.    DATE 10/20/17 10/18/17 10/13/17 10/11/17 10/6/17 10/4/17 9/26/17 9/22/17 9/18/17 9/14/17 9/12/17   VISIT 12 11 10 9 8 7 6 5 4 3 2   FOTO  dc 10/10 done 9/10 Next 8/10 7/10 6/10 5/5 DONE 4/5 3/5 2/5   MT --     10' 10' 10' 10' 10' 15'   MHP 10' 10' 10' 10' 5'  -- 10' 10' 10' 10'   UBE --        --  6'   Cervical retractions HEP 5"x15 HEP next Sitting  5"x15  ^HEP next Sitting  5"x15 Sitting  5"x15 Sitting  5"x15 5"x15 15x5" 15x5'' 15x5'' 12x5''   Cervical rotations --     D/c 5"x10 B 5"x0 B 5''x10 5"x10 B    Snow angels Wall angels x20 Wall angels w/ scapular retraction x20 Wall angels w/ scapular retraction x20 Wall angels w/ scapular retraction x20 Wall angels w/ scapular retraction x20 Wall angels w/ scapular retraction x20 W/scapular  Retraction x20 W/ scapular retraction x 20      Horizontal abduction --      YTB 2x12 2x12 TYB 2x10 YTB 2x10 YTB    Protraction --    D/c  oot 2# dowel  2x12 2x12 2# dowel 2x10 2# dowel 2x10 1# dowel    Pec Stretch Supine over towel roll 5' w/ foam roll 10' w/foam roll and MHP 10' foam roll during MHP 5' foam roll 5' foam roll 3' foam roll 3' -      Stretches: UT and Levator Scap  Pec (corner) 3x30'' ea B 30"x3 ea B 30"x3  30"x3  30"x3   Perform pec stretch " "over foam roll listed above oot 30"x3  3x30'' each 3x30'' each 3x30'' each 3x30'' each   Standing:              Rows 2x10 BTC  standing 2x10 BTC standing 2x10 BTC standing 2x10 BTC standing 2x15 GTC standing 2x15 GTC seated with 1/2 roll behind back 3x10 GTC seated  With 1/2 roll behind back 3x10 GTC seated with 1/2 roll behind back 3x10 GTC seated with 1/2 roll behind back 2x15 GTC seated with 1/2 roll behind back 2x15 GTB   Lat pull downs 2x10 BTC  standing 2x10 BTC standing 2x10 BTC standing 2x10 BTC standing 2x15 GTC standing 2x15 GTC seated with 1/2 roll behind back 3x10 GTC seated with 1/2 roll behind back 3x10 GTC seated with 1/2 roll behind back 3x10 GTC seated with 1/2 roll behind back 2x10 GTC seated with 1/2 roll behind back 2x15 RTC seated   I/T/Y With trunk supported on SB  2x12 With trunk supported on SB  2x12 With trunk supported on SB  2x12 With trunk supported on SB  2x12 With trunk supported on SB  2x10 With trunk supported on SB  2x10 With trunk supported on SB  2x10 With trunk supported on SB 2x10 With trunk supported on SB 2x10 With trunk supported on SB 2x10    Scaption YTC x10 YTB x 10 x10 RTB           Shoulder box rolls 20x 20 x20 x20 x20 x20 x20 x20 15x 15x seated 15x seated   Horizontal abduction pulses YTB x20 ea at 45, 90, and 135 deg flexion YTB x20 at 45 deg flex, 90 deg flex, 135 deg flex YTB x20 at 45 deg flex, 90 deg flex, 135 deg flex YTB x20 at 45 deg flex, 90 deg flex, 135 deg flex x20 at 45 deg flex, 90 deg flex, 135 deg flex  ^band next if able? x20 at 45 deg flex, 90 deg flex, 135 deg flex        Push up plus held held Push up +   2x10 Plus only  x10 Plus only  x10         Wall clocks held held x10 B no resistance x10 B no resistance          Wall walks 2 laps on corner wall YTB 1 laps on corner wall YTB 1 laps on corner wall TYB 3 laps on corner wall TYB 2 laps on corner wall TYB         Wall slides x15 abd B  2x15 serratus   x15 abd B  2x15 serratus x15 abd B  2x15 serratus " x15 abd B  2x15 serratus x10 abd  2x12 serratus x10 abd  2x10 serratus        INITIALS [place] DH 1/6 CC CC CC CC CS 1/6 CC [place] CC [facility]     Assessment:     Pt reports 5/10 L arm fatigue mixed with pain, not specific and no neck pain. Pt cont to be challenged by Te., and decreased pain after MHP.    Patient Education/Response:     Patient educated to continue HEP.  Verbalized understanding.      Plans and Goals:     Cont per POC, and increase reps/intensity per pt tolerance.    Short Term Goals (4 Weeks): 10/6/17  1. Pt will be independent with HEP to supplement PT in improving functional mobility.  2. Pt to improve all impaired UE MMTs to >/=4/5  grade to improve strength for lifting tasks.  3. Pt will improve cervical ROM by 10 deg in all planes to restore functional mobility.  Long Term Goals (8 Weeks): 11/6/17  1.  Pt will improve FOTO score to </= 39% limited to decrease perceived limitation with mobility.  2. Pt to improve all impaired UE MMTs to >/= 4+/5 to improve strength for lifting tasks.  3. Pt will restore cervical ROM to WFL in all planes to restore functional mobility.  4. Pt will restore UE ROM to WFL in all planes to restore functional mobility.  5. Pt will lift >/= 5 lbs with pain </=3/10 to promote functional QOL.  6. Pt will have pain </3/10 with UE MMTs to promote functional QOL

## 2017-10-20 NOTE — PROGRESS NOTES
"TIME RECORD    Date:  10/20/2017    Start Time: ***  Stop Time: ***    PROCEDURES:    TIMED  Procedure Min.   TE ***'                 UNTIMED  Procedure Min.   MHP 10'         Total Timed Minutes:  ***'  Total Timed Units:  ***  Total Untimed Units:  0  Charges Billed/# of units:  TE-***      Progress/Current Status    Subjective:     Patient ID: Ne Lang is a 42 y.o. female.  Diagnosis:   No diagnosis found.    Pain: ***/10    Objective:     MT deferred due to reports of no pain.  TE x ***' per log. Session ended with cervical MHP x10' during pec stretch on foam roller.    DATE 10/20/17 10/18/17 10/13/17 10/11/17 10/6/17 10/4/17 9/26/17 9/22/17 9/18/17 9/14/17 9/12/17   VISIT 12 11 10 9 8 7 6 5 4 3 2   FOTO  dc 10/10 done 9/10 Next 8/10 7/10 6/10 5/5 DONE 4/5 3/5 2/5   MT ***'     10' 10' 10' 10' 10' 15'   MHP ***' 10' 10' 10' 5'  -- 10' 10' 10' 10'   UBE         --  6'   Cervical retractions  5"x15 HEP next Sitting  5"x15  ^HEP next Sitting  5"x15 Sitting  5"x15 Sitting  5"x15 5"x15 15x5" 15x5'' 15x5'' 12x5''   Cervical rotations      D/c 5"x10 B 5"x0 B 5''x10 5"x10 B    Snow angels  Wall angels w/ scapular retraction x20 Wall angels w/ scapular retraction x20 Wall angels w/ scapular retraction x20 Wall angels w/ scapular retraction x20 Wall angels w/ scapular retraction x20 W/scapular  Retraction x20 W/ scapular retraction x 20      Horizontal abduction       YTB 2x12 2x12 TYB 2x10 YTB 2x10 YTB    Protraction     D/c  oot 2# dowel  2x12 2x12 2# dowel 2x10 2# dowel 2x10 1# dowel    Pec Stretch Supine over towel roll  10' w/foam roll and MHP 10' foam roll during MHP 5' foam roll 5' foam roll 3' foam roll 3' -      Stretches: UT and Levator Scap  Pec (corner)  30"x3 ea B 30"x3  30"x3  30"x3   Perform pec stretch over foam roll listed above oot 30"x3  3x30'' each 3x30'' each 3x30'' each 3x30'' each   Standing:              Rows  2x10 BTC standing 2x10 BTC standing 2x10 BTC standing 2x15 GTC standing 2x15 GTC " "seated with 1/2 roll behind back 3x10 GTC seated  With 1/2 roll behind back 3x10 GTC seated with 1/2 roll behind back 3x10 GTC seated with 1/2 roll behind back 2x15 GTC seated with 1/2 roll behind back 2x15 GTB   Lat pull downs  2x10 BTC standing 2x10 BTC standing 2x10 BTC standing 2x15 GTC standing 2x15 GTC seated with 1/2 roll behind back 3x10 GTC seated with 1/2 roll behind back 3x10 GTC seated with 1/2 roll behind back 3x10 GTC seated with 1/2 roll behind back 2x10 GTC seated with 1/2 roll behind back 2x15 RTC seated   I/T/Y  With trunk supported on SB  2x12 With trunk supported on SB  2x12 With trunk supported on SB  2x12 With trunk supported on SB  2x10 With trunk supported on SB  2x10 With trunk supported on SB  2x10 With trunk supported on SB 2x10 With trunk supported on SB 2x10 With trunk supported on SB 2x10    Scaption  YTB x 10 x10 RTB           Shoulder box rolls  20 x20 x20 x20 x20 x20 x20 15x 15x seated 15x seated   Horizontal abduction pulses  YTB x20 at 45 deg flex, 90 deg flex, 135 deg flex YTB x20 at 45 deg flex, 90 deg flex, 135 deg flex YTB x20 at 45 deg flex, 90 deg flex, 135 deg flex x20 at 45 deg flex, 90 deg flex, 135 deg flex  ^band next if able? x20 at 45 deg flex, 90 deg flex, 135 deg flex        Push up plus  held Push up +   2x10 Plus only  x10 Plus only  x10         Wall clocks  held x10 B no resistance x10 B no resistance          Wall walks  1 laps on corner wall YTB 1 laps on corner wall TYB 3 laps on corner wall TYB 2 laps on corner wall TYB         Wall slides  x15 abd B  2x15 serratus x15 abd B  2x15 serratus x15 abd B  2x15 serratus x10 abd  2x12 serratus x10 abd  2x10 serratus        INITIALS CC DH 1/6 CC CC CC CC CS 1/6 CC [facility] CC [facility]     Assessment:     Held all reps same, decreased scaption to YTB due to reports of pain after last session.  Completed all other activity with reports of "good" after treatment.    Patient Education/Response:     Patient educated to continue HEP. "  Verbalized understanding.    Plans and Goals:     Cont per POC, and increase reps/intensity per pt tolerance.    Short Term Goals (4 Weeks): 10/6/17  1. Pt will be independent with HEP to supplement PT in improving functional mobility.  2. Pt to improve all impaired UE MMTs to >/=4/5  grade to improve strength for lifting tasks.  3. Pt will improve cervical ROM by 10 deg in all planes to restore functional mobility.  Long Term Goals (8 Weeks): 11/6/17  1.  Pt will improve FOTO score to </= 39% limited to decrease perceived limitation with mobility.  2. Pt to improve all impaired UE MMTs to >/= 4+/5 to improve strength for lifting tasks.  3. Pt will restore cervical ROM to WFL in all planes to restore functional mobility.  4. Pt will restore UE ROM to WFL in all planes to restore functional mobility.  5. Pt will lift >/= 5 lbs with pain </=3/10 to promote functional QOL.  6. Pt will have pain </3/10 with UE MMTs to promote functional QOL

## 2017-10-25 ENCOUNTER — CLINICAL SUPPORT (OUTPATIENT)
Dept: REHABILITATION | Facility: HOSPITAL | Age: 42
End: 2017-10-25
Attending: ANESTHESIOLOGY
Payer: COMMERCIAL

## 2017-10-25 DIAGNOSIS — M54.2 CHRONIC NECK PAIN: ICD-10-CM

## 2017-10-25 DIAGNOSIS — R29.3 POOR POSTURE: ICD-10-CM

## 2017-10-25 DIAGNOSIS — Z74.09 IMPAIRED MOBILITY: ICD-10-CM

## 2017-10-25 DIAGNOSIS — G89.29 CHRONIC NECK PAIN: ICD-10-CM

## 2017-10-25 PROCEDURE — 97110 THERAPEUTIC EXERCISES: CPT | Mod: PN

## 2017-10-25 NOTE — PROGRESS NOTES
"TIME RECORD    Date:  10/25/2017    Start Time: 0805  Stop Time: 0907    PROCEDURES:    TIMED  Procedure Min.   TE 27'   TE supervised 25'                 UNTIMED  Procedure Min.   MHP 10' NC         Total Timed Minutes:  27'  Total Timed Units:  2  Total Untimed Units:  2  Charges Billed/# of units:  TE-2      Progress/Current Status    Subjective:     Patient ID: Ne Lang is a 42 y.o. female.  Diagnosis:   1. Chronic neck pain     2. Impaired mobility     3. Poor posture       Pt reports no pain today.     Objective:     MT deferred due to reports of no pain. TE supervised x 25' f/b TE 1:1 with PT x 27' per log. Added D2 flexion PNF to pt to improve functional periscapular strength     DATE 10/25/17 10/20/17 10/18/17 10/13/17 10/11/17 10/6/17 10/4/17 9/26/17 9/22/17 9/18/17 9/14/17 9/12/17   VISIT 13 12 11 10 9 8 7 6 5 4 3 2   FOTO Next d/c  dc 10/10 done 9/10 Next 8/10 7/10 6/10 5/5 DONE 4/5 3/5 2/5   MT  --     10' 10' 10' 10' 10' 15'   MHP 10' 10' 10' 10' 10' 5'  -- 10' 10' 10' 10'   UBE  --        --  6'   Cervical retractions HEP HEP 5"x15 HEP next Sitting  5"x15  ^HEP next Sitting  5"x15 Sitting  5"x15 Sitting  5"x15 5"x15 15x5" 15x5'' 15x5'' 12x5''   Cervical rotations  --     D/c 5"x10 B 5"x0 B 5''x10 5"x10 B    Snow angels Wall angels x20 Wall angels x20 Wall angels w/ scapular retraction x20 Wall angels w/ scapular retraction x20 Wall angels w/ scapular retraction x20 Wall angels w/ scapular retraction x20 Wall angels w/ scapular retraction x20 W/scapular  Retraction x20 W/ scapular retraction x 20      Horizontal abduction  --      YTB 2x12 2x12 TYB 2x10 YTB 2x10 YTB    Protraction  --    D/c  oot 2# dowel  2x12 2x12 2# dowel 2x10 2# dowel 2x10 1# dowel    Pec Stretch Supine over towel roll 5' w/ foam roll 5' w/ foam roll 10' w/foam roll and MHP 10' foam roll during MHP 5' foam roll 5' foam roll 3' foam roll 3' -      Stretches: UT and Levator Scap  Pec (corner) 3x30'' ea B 3x30'' ea B 30"x3 ea B " "30"x3  30"x3  30"x3   Perform pec stretch over foam roll listed above oot 30"x3  3x30'' each 3x30'' each 3x30'' each 3x30'' each   Standing:               Rows 2x10 BTC  standing 2x10 BTC  standing 2x10 BTC standing 2x10 BTC standing 2x10 BTC standing 2x15 GTC standing 2x15 GTC seated with 1/2 roll behind back 3x10 GTC seated  With 1/2 roll behind back 3x10 GTC seated with 1/2 roll behind back 3x10 GTC seated with 1/2 roll behind back 2x15 GTC seated with 1/2 roll behind back 2x15 GTB   Lat pull downs 2x10 BTC  standing 2x10 BTC  standing 2x10 BTC standing 2x10 BTC standing 2x10 BTC standing 2x15 GTC standing 2x15 GTC seated with 1/2 roll behind back 3x10 GTC seated with 1/2 roll behind back 3x10 GTC seated with 1/2 roll behind back 3x10 GTC seated with 1/2 roll behind back 2x10 GTC seated with 1/2 roll behind back 2x15 RTC seated   I/T/Y With trunk supported 5n SB  2x15 With trunk supported on SB  2x12 With trunk supported on SB  2x12 With trunk supported on SB  2x12 With trunk supported on SB  2x12 With trunk supported on SB  2x10 With trunk supported on SB  2x10 With trunk supported on SB  2x10 With trunk supported on SB 2x10 With trunk supported on SB 2x10 With trunk supported on SB 2x10    Scaption YTC x10 YTC x10 YTB x 10 x10 RTB           Shoulder box rolls 20x 20x 20 x20 x20 x20 x20 x20 x20 15x 15x seated 15x seated   D2 Flexion PNF 2x10 B YTB              Horizontal abduction pulses RTB 2x10 ea at 45, 90, and 135 deg flexion YTB x20 ea at 45, 90, and 135 deg flexion YTB x20 at 45 deg flex, 90 deg flex, 135 deg flex YTB x20 at 45 deg flex, 90 deg flex, 135 deg flex YTB x20 at 45 deg flex, 90 deg flex, 135 deg flex x20 at 45 deg flex, 90 deg flex, 135 deg flex  ^band next if able? x20 at 45 deg flex, 90 deg flex, 135 deg flex        Push up plus Push up +   x10 held held Push up +   2x10 Plus only  x10 Plus only  x10         Wall clocks x10 B no resistance held held x10 B no resistance x10 B no resistance     "      Wall walks 2 laps up/down and side to side on corner wall YTB 2 laps on corner wall YTB 1 laps on corner wall YTB 1 laps on corner wall TYB 3 laps on corner wall TYB 2 laps on corner wall TYB         Wall slides x15 abd B  2x15 serratus x15 abd B  2x15 serratus   x15 abd B  2x15 serratus x15 abd B  2x15 serratus x15 abd B  2x15 serratus x10 abd  2x12 serratus x10 abd  2x10 serratus        INITIALS CC [place] DH 1/6 CC CC CC CC CS 1/6 CC [place] CC [facility]     Assessment:     Verbal cues and manual assist for positioning and technique for push up + exercise, improved over time. Pt has shown great improvements in strength and pain since initial evaluation and may be ready for early d/c next visit.     Patient Education/Response:     Patient educated to continue HEP.  Updated HEP to be given at d/c, planned for Friday. Verbalized understanding.    Plans and Goals:     Cont POC. Plan to d/c next visit    Short Term Goals (4 Weeks): 10/6/17  1. Pt will be independent with HEP to supplement PT in improving functional mobility.  2. Pt to improve all impaired UE MMTs to >/=4/5  grade to improve strength for lifting tasks.  3. Pt will improve cervical ROM by 10 deg in all planes to restore functional mobility.  Long Term Goals (8 Weeks): 11/6/17  1.  Pt will improve FOTO score to </= 39% limited to decrease perceived limitation with mobility.  2. Pt to improve all impaired UE MMTs to >/= 4+/5 to improve strength for lifting tasks.  3. Pt will restore cervical ROM to WFL in all planes to restore functional mobility.  4. Pt will restore UE ROM to WFL in all planes to restore functional mobility.  5. Pt will lift >/= 5 lbs with pain </=3/10 to promote functional QOL.  6. Pt will have pain </3/10 with UE MMTs to promote functional QOL

## 2017-10-27 ENCOUNTER — CLINICAL SUPPORT (OUTPATIENT)
Dept: REHABILITATION | Facility: HOSPITAL | Age: 42
End: 2017-10-27
Attending: ANESTHESIOLOGY
Payer: COMMERCIAL

## 2017-10-27 ENCOUNTER — DOCUMENTATION ONLY (OUTPATIENT)
Dept: REHABILITATION | Facility: HOSPITAL | Age: 42
End: 2017-10-27

## 2017-10-27 DIAGNOSIS — M54.2 CHRONIC NECK PAIN: ICD-10-CM

## 2017-10-27 DIAGNOSIS — R29.3 POOR POSTURE: ICD-10-CM

## 2017-10-27 DIAGNOSIS — G89.29 CHRONIC NECK PAIN: ICD-10-CM

## 2017-10-27 DIAGNOSIS — Z74.09 IMPAIRED MOBILITY: ICD-10-CM

## 2017-10-27 PROCEDURE — 97110 THERAPEUTIC EXERCISES: CPT | Mod: PN

## 2017-10-27 NOTE — PROGRESS NOTES
TIME RECORD    Date:  10/27/2017    Start Time: 0806  Stop Time: 0852    PROCEDURES:    TIMED  Procedure Min.   TE 46'                 UNTIMED  Procedure Min.             Total Timed Minutes:  46'  Total Timed Units:  3  Total Untimed Units:    Charges Billed/# of units:  TE-3      Progress/Current Status    Subjective:     Patient ID: Ne Lang is a 42 y.o. female.  Diagnosis:   1. Chronic neck pain     2. Impaired mobility     3. Poor posture       Pt reports no pain today. Pt reports 90% improvement since initial evaluation. Improvements include pain, posture, flexibility, and UE strength. Pt feels more independent in ADLs now that she has less pain. Pt feels at this point she has received max benefit from PT and would benefit from d/c with continuation of HEP.     Objective:     TE x 46' including objective measurements.      Posture: Sitting: rounded shoulders  Range of Motion/Strength:      Cervical AROM: Pain/Dysfunction with Movement:   Flexion 47 No pain   Extension 60 No pain   Right side bending 35 No pain   Left side bending 50 No pain   Right rotation 75 No pain   Left rotation 75 No pain      UE MMTs  Horizontal abduction: 4/5 B  Scaption: 4+/5 R, 4/5 L  Rhomboids: 4/5 B, slight pain in shoulders B     Shoulder   Right     Left   Pain/Dysfunction with Movement     AROM PROM MMT AROM PROM MMT     flexion WNL NT 4/5 WNL NT 4/5 No pain   abduction 160  170 4/5 160 180 4/5 Slight pain in elbows B with MMT   Internal rotation NT NT 5/5 NT NT 5/5 Functional IR T12 B   ER at 0° abd NT NT 4/5 NT NT 4+/5 Slight pain in elbows B with MMT R>L; Functional ER T4 B      Elbow   Right     Left   Pain/Dysfunction with Movement     AROM PROM MMT AROM PROM MMT     flexion NT NT 5/5 NT NT 4+/5 No pain   extension NT NT 5/5 NT NT 4+/5 No pain       (#)     Right 19 kg   Left 17 kg      Other: Chin tuck + cervical flexion (logissimus colli strength): Pt able to hold for 16 sec (fair activation)  FOTO Neck Survey  "Score: Changing & maintaining body position 34% limited (48% at eval, 39% for d/c)    DATE 10/27/17 10/25/17 10/20/17 10/18/17 10/13/17 10/11/17 10/6/17 10/4/17 9/26/17 9/22/17 9/18/17 9/14/17 9/12/17   VISIT 14 13 12 11 10 9 8 7 6 5 4 3 2   FOTO Done  Next d/c  dc 10/10 done 9/10 Next 8/10 7/10 6/10 5/5 DONE 4/5 3/5 2/5   MT   --     10' 10' 10' 10' 10' 15'   MHP  10' 10' 10' 10' 10' 5'  -- 10' 10' 10' 10'   UBE   --        --  6'   Cervical retractions  HEP HEP 5"x15 HEP next Sitting  5"x15  ^HEP next Sitting  5"x15 Sitting  5"x15 Sitting  5"x15 5"x15 15x5" 15x5'' 15x5'' 12x5''   Cervical rotations   --     D/c 5"x10 B 5"x0 B 5''x10 5"x10 B    Snow angels  Wall angels x20 Wall angels x20 Wall angels w/ scapular retraction x20 Wall angels w/ scapular retraction x20 Wall angels w/ scapular retraction x20 Wall angels w/ scapular retraction x20 Wall angels w/ scapular retraction x20 W/scapular  Retraction x20 W/ scapular retraction x 20      Horizontal abduction   --      YTB 2x12 2x12 TYB 2x10 YTB 2x10 YTB    Protraction   --    D/c  oot 2# dowel  2x12 2x12 2# dowel 2x10 2# dowel 2x10 1# dowel    Pec Stretch Supine over towel roll  5' w/ foam roll 5' w/ foam roll 10' w/foam roll and MHP 10' foam roll during MHP 5' foam roll 5' foam roll 3' foam roll 3' -      Stretches: UT and Levator Scap  Pec (corner)  3x30'' ea B 3x30'' ea B 30"x3 ea B 30"x3  30"x3  30"x3   Perform pec stretch over foam roll listed above oot 30"x3  3x30'' each 3x30'' each 3x30'' each 3x30'' each   Standing:                Rows  2x10 BTC  standing 2x10 BTC  standing 2x10 BTC standing 2x10 BTC standing 2x10 BTC standing 2x15 GTC standing 2x15 GTC seated with 1/2 roll behind back 3x10 GTC seated  With 1/2 roll behind back 3x10 GTC seated with 1/2 roll behind back 3x10 GTC seated with 1/2 roll behind back 2x15 GTC seated with 1/2 roll behind back 2x15 GTB   Lat pull downs  2x10 BTC  standing 2x10 BTC  standing 2x10 BTC standing 2x10 BTC standing 2x10 " BTC standing 2x15 GTC standing 2x15 GTC seated with 1/2 roll behind back 3x10 GTC seated with 1/2 roll behind back 3x10 GTC seated with 1/2 roll behind back 3x10 GTC seated with 1/2 roll behind back 2x10 GTC seated with 1/2 roll behind back 2x15 RTC seated   I/T/Y  With trunk supported 5n SB  2x15 With trunk supported on SB  2x12 With trunk supported on SB  2x12 With trunk supported on SB  2x12 With trunk supported on SB  2x12 With trunk supported on SB  2x10 With trunk supported on SB  2x10 With trunk supported on SB  2x10 With trunk supported on SB 2x10 With trunk supported on SB 2x10 With trunk supported on SB 2x10    Scaption  YTC x10 YTC x10 YTB x 10 x10 RTB           Shoulder box rolls  20x 20x 20 x20 x20 x20 x20 x20 x20 15x 15x seated 15x seated   D2 Flexion PNF  2x10 B YTB              Horizontal abduction pulses  RTB 2x10 ea at 45, 90, and 135 deg flexion YTB x20 ea at 45, 90, and 135 deg flexion YTB x20 at 45 deg flex, 90 deg flex, 135 deg flex YTB x20 at 45 deg flex, 90 deg flex, 135 deg flex YTB x20 at 45 deg flex, 90 deg flex, 135 deg flex x20 at 45 deg flex, 90 deg flex, 135 deg flex  ^band next if able? x20 at 45 deg flex, 90 deg flex, 135 deg flex        Push up plus  Push up +   x10 held held Push up +   2x10 Plus only  x10 Plus only  x10         Wall clocks  x10 B no resistance held held x10 B no resistance x10 B no resistance          Wall walks  2 laps up/down and side to side on corner wall YTB 2 laps on corner wall YTB 1 laps on corner wall YTB 1 laps on corner wall TYB 3 laps on corner wall TYB 2 laps on corner wall TYB         Wall slides  x15 abd B  2x15 serratus x15 abd B  2x15 serratus   x15 abd B  2x15 serratus x15 abd B  2x15 serratus x15 abd B  2x15 serratus x10 abd  2x12 serratus x10 abd  2x10 serratus        INITIALS CC CC [place] DH 1/6 CC CC CC CC CS 1/6 CC [place] CC [place]     Assessment:     Since beginning PT, pt has been seen 14 times since initial evaluation on 9/6/17. Overall, she has  made good, steady progress with her PT treatments and has worked hard towards all of her PT goals as evidenced by subjective and objective improvements. Since attending PT she has reached most of her PT goals. She will be discharged with an updated HEP and was instructed to contact us with any other questions or concerns. Pt agreeable to d/c.    Patient Education/Response:     Pt given updated HEP inlcuding pec/UT/LV stretches, chin tuck and lift, lats, rows, ITYs, serratus wall slides with theraband, wall angels, scaption with theraband    Plans and Goals:     D/c at this time    Short Term Goals (4 Weeks): 10/6/17  1. Pt will be independent with HEP to supplement PT in improving functional mobility. MET  2. Pt to improve all impaired UE MMTs to >/=4/5  grade to improve strength for lifting tasks. MET  3. Pt will improve cervical ROM by 10 deg in all planes to restore functional mobility. MET  Long Term Goals (8 Weeks): 11/6/17  1.  Pt will improve FOTO score to </= 39% limited to decrease perceived limitation with mobility. MET  2. Pt to improve all impaired UE MMTs to >/= 4+/5 to improve strength for lifting tasks. PARTIALLY MET  3. Pt will restore cervical ROM to WFL in all planes to restore functional mobility. MET  4. Pt will restore UE ROM to WFL in all planes to restore functional mobility. MET  5. Pt will lift >/= 5 lbs with pain </=3/10 to promote functional QOL. MET  6. Pt will have pain </3/10 with UE MMTs to promote functional QOL MET

## 2017-10-27 NOTE — PROGRESS NOTES
Face to Face PTA Conference performed with Rosa Thomson PTA, Elena Robert PTA, Ryan Adair PTA Taiwo Howe PTA regarding patient's current status, overall progress, and plan of care    Face to face PT Conference performed with Margarita Sethi PT , Priya Ramos PT , Valentina Cervantes PT , Rosa Mata PT  regarding patient progress, current status, and plan of care.     Taiwo Howe, PTA    Face to face meeting completed with Rosa Mata PT regarding progress of current course of care for  Ne Lang .  Violet Thomson, PTA    Face to face meeting completed with Rosa Mata PT regarding progress of current course of care for  Ne Lang . Elena Robert PTA    Face to face meeting completed with Rosa Mata PT regarding progress of current course of care for  Ne Lang .  Ryan Adair, PTA

## 2017-11-13 ENCOUNTER — OFFICE VISIT (OUTPATIENT)
Dept: PAIN MEDICINE | Facility: CLINIC | Age: 42
End: 2017-11-13
Payer: COMMERCIAL

## 2017-11-13 VITALS — WEIGHT: 154 LBS | HEART RATE: 68 BPM | SYSTOLIC BLOOD PRESSURE: 122 MMHG | DIASTOLIC BLOOD PRESSURE: 78 MMHG

## 2017-11-13 DIAGNOSIS — M25.50 ARTHRALGIA, UNSPECIFIED JOINT: ICD-10-CM

## 2017-11-13 DIAGNOSIS — M79.7 FIBROMYALGIA: Primary | ICD-10-CM

## 2017-11-13 DIAGNOSIS — M54.2 CERVICALGIA: ICD-10-CM

## 2017-11-13 PROCEDURE — 99999 PR PBB SHADOW E&M-EST. PATIENT-LVL II: CPT | Mod: PBBFAC,,, | Performed by: ANESTHESIOLOGY

## 2017-11-13 PROCEDURE — 99213 OFFICE O/P EST LOW 20 MIN: CPT | Mod: S$GLB,,, | Performed by: ANESTHESIOLOGY

## 2017-11-13 RX ORDER — NAPROXEN AND ESOMEPRAZOLE MAGNESIUM 500; 20 MG/1; MG/1
1 TABLET, DELAYED RELEASE ORAL 2 TIMES DAILY PRN
Refills: 4 | COMMUNITY
Start: 2017-10-31

## 2017-11-13 NOTE — PROGRESS NOTES
Chronic patient Established Note (Follow up visit)      SUBJECTIVE:    Ne Lang presents to the clinic for a follow-up appointment for 3 month follow up neck pain and bilateral arm pain. Since the last visit, Ne Lang states the pain has been improving. Current pain intensity is 0/10.  I started her on Vimovo and compounding cream, , and referred her to PT. Since then her pain has much improved     Pain Disability Index Review:  Last 3 PDI Scores 11/13/2017   Pain Disability Index (PDI) 0       Pain Medications:    - Opioids: None  - Adjuvant Medications: Dulvanex 60mg QD (dueloxetine) ,  Melex, prn insomnia (benzodiazepines) , Piascledine 300mg, Naproxen/paracetamol  (Aster-Apranex), Vimovo, compounding cream  - Anti-Coagulants: None  - Others: see medications list    Opioid Contract: no     report:  Reviewed and consistent with medication use as prescribed.    Pain Procedures: Injections in [place], last was 7/27/17     Physical Therapy/Home Exercise: yes, HEP after completing PT    Imaging: pt brought with her , Pashto report      Rectification of the physiological lordosis with calcification of anterior longitudinal ligament C5-6, normal central canal. , no significant disc bulge            Allergies:   Review of patient's allergies indicates:   Allergen Reactions    Penicillins        Current Medications:   Current Outpatient Prescriptions   Medication Sig Dispense Refill    duloxetine (CYMBALTA) 60 MG capsule Take 60 mg by mouth once daily.      VIMOVO 500-20 mg TbID Take 1 tablet by mouth 2 (two) times daily as needed.  4     No current facility-administered medications for this visit.        REVIEW OF SYSTEMS:  did all translation and interpretation     GENERAL:  No weight loss, malaise or fevers.  HEENT:  Negative for frequent or significant headaches.  NECK:  Negative for lumps, goiter, pain and significant neck swelling.  RESPIRATORY:  Negative for cough, wheezing or  shortness of breath.  CARDIOVASCULAR:  Negative for chest pain, leg swelling or palpitations.  GI:  Negative for abdominal discomfort, blood in stools or black stools or change in bowel habits.  MUSCULOSKELETAL:  See HPI., + fibromyalgia   SKIN:  Negative for lesions, rash, and itching.  PSYCH:  Negative for sleep disturbance, mood disorder and recent psychosocial stressors.  HEMATOLOGY/LYMPHOLOGY:  Negative for prolonged bleeding, bruising easily or swollen nodes.  NEURO:   No history of headaches, syncope, paralysis, seizures or tremors.  All other reviewed and negative other than HPI.    Past Medical History:  No past medical history on file.    Past Surgical History:  No past surgical history on file.    Family History:  No family history on file.    Social History:  Social History     Social History    Marital status:      Spouse name: N/A    Number of children: N/A    Years of education: N/A     Social History Main Topics    Smoking status: Never Smoker    Smokeless tobacco: None    Alcohol use None    Drug use: Unknown    Sexual activity: Not Asked     Other Topics Concern    None     Social History Narrative    None       OBJECTIVE:    /78   Pulse 68   Wt 69.9 kg (154 lb)     PHYSICAL EXAMINATION:    General appearance: Well appearing, in no acute distress, alert and oriented x3.  Psych:  Mood and affect appropriate.  Skin: Skin color, texture, turgor normal, no rashes or lesions, in both upper and lower body.  Head/face:  Atraumatic, normocephalic. No palpable lymph nodes  Neck: No pain to palpation over the cervical paraspinous muscles. Spurling Negative. No pain with neck flexion, extension, or lateral flexion. .  Back: Straight leg raising in the sitting and supine positions is negative to radicular pain. No pain to palpation over the spine or costovertebral angles. Normal range of motion without pain reproduction.  Extremities: + TTP over both elbows   Musculoskeletal: Shoulder,  hip, sacroiliac and knee provocative maneuvers are negative. Bilateral upper and lower extremity strength is normal and symmetric.  No atrophy or tone abnormalities are noted.  Neuro: Bilateral upper and lower extremity coordination and muscle stretch reflexes are physiologic and symmetric.  Plantar response are downgoing. No loss of sensation is noted.  Gait: Normal.    ASSESSMENT:42 y.o. year old female with neck and bilateral arm and diffuse body pains  pain, consistent with       1. Fibromyalgia    2. Cervicalgia         did all the interpretation   started her on Vimovo and compounding cream, , and referred her to PT. Since then her pain has much improved     PLAN:     -Contineu current regimen  -Relaxation techniques, meditation advised   -Sleep hygiene and physical activity also advised   - RTC in 3 months   - Counseled patient regarding the importance of constant sleeping habits and physical therapy.    The above plan and management options were discussed at length with patient. Patient is in agreement with the above and verbalized understanding.    Charli Dunn  11/13/2017

## 2018-10-02 ENCOUNTER — HOSPITAL ENCOUNTER (EMERGENCY)
Facility: HOSPITAL | Age: 43
Discharge: HOME | End: 2018-10-02
Attending: EMERGENCY MEDICINE
Payer: COMMERCIAL

## 2018-10-02 VITALS
TEMPERATURE: 98 F | DIASTOLIC BLOOD PRESSURE: 52 MMHG | OXYGEN SATURATION: 100 % | HEART RATE: 84 BPM | RESPIRATION RATE: 18 BRPM | HEIGHT: 62 IN | SYSTOLIC BLOOD PRESSURE: 100 MMHG | BODY MASS INDEX: 29.44 KG/M2 | WEIGHT: 160 LBS

## 2018-10-02 DIAGNOSIS — N12 PYELONEPHRITIS: Primary | ICD-10-CM

## 2018-10-02 LAB
ALBUMIN SERPL BCP-MCNC: 3.7 G/DL
ALP SERPL-CCNC: 108 U/L
ALT SERPL W/O P-5'-P-CCNC: 28 U/L
ANION GAP SERPL CALC-SCNC: 12 MMOL/L
AST SERPL-CCNC: 25 U/L
B-HCG UR QL: NEGATIVE
BACTERIA #/AREA URNS HPF: ABNORMAL /HPF
BASOPHILS # BLD AUTO: 0.03 K/UL
BASOPHILS NFR BLD: 0.2 %
BILIRUB SERPL-MCNC: 0.5 MG/DL
BILIRUB UR QL STRIP: NEGATIVE
BUN SERPL-MCNC: 6 MG/DL
CALCIUM SERPL-MCNC: 9.5 MG/DL
CHLORIDE SERPL-SCNC: 101 MMOL/L
CLARITY UR: ABNORMAL
CO2 SERPL-SCNC: 21 MMOL/L
COLOR UR: YELLOW
CREAT SERPL-MCNC: 0.9 MG/DL
CTP QC/QA: YES
DIFFERENTIAL METHOD: ABNORMAL
EOSINOPHIL # BLD AUTO: 0.1 K/UL
EOSINOPHIL NFR BLD: 0.8 %
ERYTHROCYTE [DISTWIDTH] IN BLOOD BY AUTOMATED COUNT: 15.4 %
EST. GFR  (AFRICAN AMERICAN): >60 ML/MIN/1.73 M^2
EST. GFR  (NON AFRICAN AMERICAN): >60 ML/MIN/1.73 M^2
GLUCOSE SERPL-MCNC: 106 MG/DL
GLUCOSE UR QL STRIP: NEGATIVE
HCT VFR BLD AUTO: 34.9 %
HGB BLD-MCNC: 10.9 G/DL
HGB UR QL STRIP: ABNORMAL
KETONES UR QL STRIP: NEGATIVE
LACTATE SERPL-SCNC: 1.7 MMOL/L
LEUKOCYTE ESTERASE UR QL STRIP: ABNORMAL
LIPASE SERPL-CCNC: 54 U/L
LYMPHOCYTES # BLD AUTO: 2.8 K/UL
LYMPHOCYTES NFR BLD: 21.6 %
MCH RBC QN AUTO: 23.1 PG
MCHC RBC AUTO-ENTMCNC: 31.2 G/DL
MCV RBC AUTO: 74 FL
MICROSCOPIC COMMENT: ABNORMAL
MONOCYTES # BLD AUTO: 1.1 K/UL
MONOCYTES NFR BLD: 8.4 %
NEUTROPHILS # BLD AUTO: 8.7 K/UL
NEUTROPHILS NFR BLD: 68.5 %
NITRITE UR QL STRIP: NEGATIVE
PH UR STRIP: 7 [PH] (ref 5–8)
PLATELET # BLD AUTO: 423 K/UL
PMV BLD AUTO: 9.5 FL
POTASSIUM SERPL-SCNC: 3.9 MMOL/L
PROT SERPL-MCNC: 7.9 G/DL
PROT UR QL STRIP: NEGATIVE
RBC # BLD AUTO: 4.71 M/UL
RBC #/AREA URNS HPF: 0 /HPF (ref 0–4)
SODIUM SERPL-SCNC: 134 MMOL/L
SP GR UR STRIP: <=1.005 (ref 1–1.03)
SQUAMOUS #/AREA URNS HPF: 0 /HPF
URN SPEC COLLECT METH UR: ABNORMAL
UROBILINOGEN UR STRIP-ACNC: NEGATIVE EU/DL
WBC # BLD AUTO: 12.77 K/UL
WBC #/AREA URNS HPF: 75 /HPF (ref 0–5)

## 2018-10-02 PROCEDURE — 25000003 PHARM REV CODE 250: Performed by: EMERGENCY MEDICINE

## 2018-10-02 PROCEDURE — 85025 COMPLETE CBC W/AUTO DIFF WBC: CPT

## 2018-10-02 PROCEDURE — 63600175 PHARM REV CODE 636 W HCPCS: Performed by: EMERGENCY MEDICINE

## 2018-10-02 PROCEDURE — 96365 THER/PROPH/DIAG IV INF INIT: CPT

## 2018-10-02 PROCEDURE — 83690 ASSAY OF LIPASE: CPT

## 2018-10-02 PROCEDURE — 87088 URINE BACTERIA CULTURE: CPT

## 2018-10-02 PROCEDURE — 81025 URINE PREGNANCY TEST: CPT | Performed by: EMERGENCY MEDICINE

## 2018-10-02 PROCEDURE — 87086 URINE CULTURE/COLONY COUNT: CPT

## 2018-10-02 PROCEDURE — 87077 CULTURE AEROBIC IDENTIFY: CPT

## 2018-10-02 PROCEDURE — 96361 HYDRATE IV INFUSION ADD-ON: CPT

## 2018-10-02 PROCEDURE — 87186 SC STD MICRODIL/AGAR DIL: CPT

## 2018-10-02 PROCEDURE — 83605 ASSAY OF LACTIC ACID: CPT

## 2018-10-02 PROCEDURE — 96375 TX/PRO/DX INJ NEW DRUG ADDON: CPT

## 2018-10-02 PROCEDURE — 99284 EMERGENCY DEPT VISIT MOD MDM: CPT | Mod: 25

## 2018-10-02 PROCEDURE — 80053 COMPREHEN METABOLIC PANEL: CPT

## 2018-10-02 PROCEDURE — 81000 URINALYSIS NONAUTO W/SCOPE: CPT

## 2018-10-02 RX ORDER — SODIUM CHLORIDE 9 MG/ML
1000 INJECTION, SOLUTION INTRAVENOUS
Status: COMPLETED | OUTPATIENT
Start: 2018-10-02 | End: 2018-10-02

## 2018-10-02 RX ORDER — KETOROLAC TROMETHAMINE 30 MG/ML
30 INJECTION, SOLUTION INTRAMUSCULAR; INTRAVENOUS
Status: COMPLETED | OUTPATIENT
Start: 2018-10-02 | End: 2018-10-02

## 2018-10-02 RX ORDER — ONDANSETRON 4 MG/1
4 TABLET, FILM COATED ORAL EVERY 6 HOURS
Qty: 12 TABLET | Refills: 0 | Status: SHIPPED | OUTPATIENT
Start: 2018-10-02 | End: 2018-10-02 | Stop reason: SDUPTHER

## 2018-10-02 RX ORDER — ONDANSETRON 4 MG/1
4 TABLET, FILM COATED ORAL EVERY 6 HOURS
Qty: 12 TABLET | Refills: 0 | Status: SHIPPED | OUTPATIENT
Start: 2018-10-02

## 2018-10-02 RX ORDER — CEPHALEXIN 500 MG/1
500 CAPSULE ORAL 4 TIMES DAILY
Qty: 40 CAPSULE | Refills: 0 | Status: SHIPPED | OUTPATIENT
Start: 2018-10-02 | End: 2018-10-12

## 2018-10-02 RX ORDER — ONDANSETRON 2 MG/ML
4 INJECTION INTRAMUSCULAR; INTRAVENOUS
Status: COMPLETED | OUTPATIENT
Start: 2018-10-02 | End: 2018-10-02

## 2018-10-02 RX ORDER — CEPHALEXIN 500 MG/1
500 CAPSULE ORAL 4 TIMES DAILY
Qty: 20 CAPSULE | Refills: 0 | Status: SHIPPED | OUTPATIENT
Start: 2018-10-02 | End: 2018-10-02 | Stop reason: SDUPTHER

## 2018-10-02 RX ADMIN — CEFTRIAXONE 1 G: 1 INJECTION, SOLUTION INTRAVENOUS at 10:10

## 2018-10-02 RX ADMIN — ONDANSETRON HYDROCHLORIDE 4 MG: 2 SOLUTION INTRAMUSCULAR; INTRAVENOUS at 09:10

## 2018-10-02 RX ADMIN — KETOROLAC TROMETHAMINE 30 MG: 30 INJECTION, SOLUTION INTRAMUSCULAR at 09:10

## 2018-10-02 RX ADMIN — SODIUM CHLORIDE 1000 ML: 0.9 INJECTION, SOLUTION INTRAVENOUS at 08:10

## 2018-10-03 NOTE — ED NOTES
Pt here c/o fever,nasuea,lower bad pain and urinary s/s for 2-3 days. Denies vomiting or diarrhea.

## 2018-10-03 NOTE — ED PROVIDER NOTES
"Encounter Date: 10/2/2018       History     Chief Complaint   Patient presents with    Flank Pain     43y F ambulatory to ED with c/o left flank pain, lower abdominal pain, and dysuria x3 days. pt started running fever today. tylenol last taken at 1830     Patient is a 43-year-old  female with pmhx of fibromyalgia who presents ED with a complaint of left-sided flank pain and dysuria x3 days with associated body aches, chills and subjective fever (for which she took APAP PTA).  Patient denies nausea and vomiting, chest pain, or shortness of breath when explicitly asked.   Patient describes the flank pain as intermittent in nature without radiation.  At it's worst , she rates it as "8/10." Patient does report a remote history of nephrolithiasis in the past.            Review of patient's allergies indicates:   Allergen Reactions    Penicillins      No past medical history on file.  No past surgical history on file.  No family history on file.  Social History     Tobacco Use    Smoking status: Never Smoker   Substance Use Topics    Alcohol use: Not on file    Drug use: Not on file     Review of Systems   Constitutional: Positive for chills and fever (subjective per patient). Negative for activity change.   HENT: Negative for congestion, sinus pressure, sneezing and sore throat.    Eyes: Negative for photophobia and visual disturbance.   Respiratory: Negative for cough and chest tightness.    Cardiovascular: Negative for chest pain, palpitations and leg swelling.   Gastrointestinal: Positive for abdominal pain (L sided flank pain) and nausea. Negative for diarrhea and vomiting.   Endocrine: Negative for polydipsia and polyphagia.   Genitourinary: Positive for flank pain. Negative for dysuria, hematuria, urgency, vaginal bleeding, vaginal discharge and vaginal pain.   Musculoskeletal: Negative for arthralgias and gait problem.   Neurological: Negative for dizziness and headaches.   Psychiatric/Behavioral: " Negative for agitation and confusion.       Physical Exam     Initial Vitals [10/02/18 1927]   BP Pulse Resp Temp SpO2   120/76 106 16 99.1 °F (37.3 °C) 100 %      MAP       --         Physical Exam    Nursing note and vitals reviewed.  Constitutional: She appears well-developed and well-nourished. She is not diaphoretic. No distress.   HENT:   Head: Normocephalic and atraumatic.   Right Ear: External ear normal.   Left Ear: External ear normal.   Eyes: Conjunctivae and EOM are normal. Pupils are equal, round, and reactive to light.   Neck: Normal range of motion. Neck supple.   Cardiovascular: Normal rate, regular rhythm, normal heart sounds and intact distal pulses.   Pulmonary/Chest: Breath sounds normal. No respiratory distress. She has no wheezes.   Abdominal: Soft. Bowel sounds are normal.   L sided CVA tenderness  +BS in all four quad  No rebound  No guarding  No peritoneal signs  Neg Turner's sign  Neg McBurney's point tenderness  Neg Heel tap   Musculoskeletal: Normal range of motion.   Neurological: She is alert and oriented to person, place, and time.   Skin: Skin is warm and dry. Capillary refill takes less than 2 seconds.   Psychiatric: She has a normal mood and affect. Thought content normal.         ED Course   Procedures  Labs Reviewed   CBC W/ AUTO DIFFERENTIAL   COMPREHENSIVE METABOLIC PANEL   LIPASE   LACTIC ACID, PLASMA   URINALYSIS, REFLEX TO URINE CULTURE   POCT URINE PREGNANCY          Imaging Results    None          Medical Decision Making:   Initial Assessment:   43-year-old  female who presents ED with a complaint of left-sided flank pain and dysuria x3 days with associated body aches, chills and subjective fever  Differential Diagnosis:   UTI, nephrolithiasis, pyelonephritis, renal carcinoma, nephroptosis, urinary retention, renal cysts, megaureter, muscle spasm, neuralgia, Ormond's disease   ED Management:  - CBC w/diff WNL   - CMP WNL   - Lipase WNL   - UA 3+ leukocytes,  moderate bacteria; neg nitrite   - UPT negative   - Lactic acid WNL   - Ketorolac 30mg IV x 1  - Zofran 4mg IV x 1  - 1L NS IVF bolus x 1  - CT renal stone study ordered, pending as of shift change  - Care of patient transferred to Dr. Ham as of shift change; pt, family members and Dr. Ham updated regarding ED workup   - CT shows Minimal apparent prominence of the bilateral renal collecting systems and ureters with possible associated urothelial/wall thickening, incompletely evaluated on this noncontrast exam but concerning for an evolving infectious process given the patient's reported history of flank pain. No definite obstructing stones.   - Patient given 1g rocephin in ED. Patient reports she is feeling much better.  - discharged home with prescriptions for keflex and zofran and instructed to follow up with her PCP.                      Clinical Impression:   The encounter diagnosis was Pyelonephritis.                             Chrissie Helms PA-C  10/02/18 [id number]

## 2018-10-04 LAB — BACTERIA UR CULT: NORMAL

## 2018-10-05 NOTE — PROVIDER PROGRESS NOTES - EMERGENCY DEPT.
Encounter Date: 10/2/2018    ED Physician Progress Notes        Physician Note:   10/4/18- discharged on cephalexin- LILLIAM Wu    10/05/18 6:59 AM - urine culture shows sensitivity to keflex.  No further f/u needed.  LAURO